# Patient Record
Sex: FEMALE | Race: WHITE | NOT HISPANIC OR LATINO | Employment: FULL TIME | URBAN - METROPOLITAN AREA
[De-identification: names, ages, dates, MRNs, and addresses within clinical notes are randomized per-mention and may not be internally consistent; named-entity substitution may affect disease eponyms.]

---

## 2022-02-28 ENCOUNTER — TELEPHONE (OUTPATIENT)
Dept: NEUROLOGY | Facility: CLINIC | Age: 27
End: 2022-02-28
Payer: COMMERCIAL

## 2022-02-28 NOTE — TELEPHONE ENCOUNTER
[facility] Call Center    Phone Message    May a detailed message be left on voicemail: yes     Reason for Call:  Patient is calling to schedule appt with  when available. Please call back    Action Taken: Message routed to:  [facility] [facility]    Travel Screening: Not Applicable

## 2022-05-20 NOTE — TELEPHONE ENCOUNTER
Action 5/20/22 MV 8.27am   Action Taken Imaging request faxed to PN +     5/23/22 MV 12.36p  Images resolved in PACS         RECORDS RECEIVED FROM: self - prev pt   REASON FOR VISIT: MS   Date of Appt: 7/6/22   NOTES (FOR ALL VISITS) STATUS DETAILS   OFFICE NOTE from other specialist Care Everywhere Dr Ana Kwok @  Neuro:  10/1/21  10/7/20  2/28/20  (additional)   DISCHARGE SUMMARY from hospital Care Everywhere [facility]:  9/14/18-9/17/18   MEDICATION LIST Care Everywhere    IMAGING  (FOR ALL VISITS)     LUMBAR PUNCTURE Care Everywhere [facility]:  10/8/18  9/15/18   MRI (HEAD, NECK, SPINE) Received [facility]:  MRI Brain 8/6/21  MRI Brain 8/4/20  MRI Thoracic Spine 8/4/20  MRI Cervical Spine 8/4/20  MRI Brain 12/21/18  MRI Thoracic Spine 10/5/18  MRI Cervical Spine 10/5/18  MRI Brain 9/30/18  MRI Brain 9/14/18    [facility]:  MRI Brain 11/4/19

## 2022-07-06 ENCOUNTER — PRE VISIT (OUTPATIENT)
Dept: NEUROLOGY | Facility: CLINIC | Age: 27
End: 2022-07-06
Payer: COMMERCIAL

## 2022-07-06 ENCOUNTER — TELEPHONE (OUTPATIENT)
Dept: NEUROLOGY | Facility: CLINIC | Age: 27
End: 2022-07-06

## 2022-07-06 ENCOUNTER — OFFICE VISIT (OUTPATIENT)
Dept: NEUROLOGY | Facility: CLINIC | Age: 27
End: 2022-07-06
Attending: PSYCHIATRY & NEUROLOGY
Payer: COMMERCIAL

## 2022-07-06 VITALS
HEIGHT: 60 IN | HEART RATE: 83 BPM | WEIGHT: 141 LBS | SYSTOLIC BLOOD PRESSURE: 114 MMHG | BODY MASS INDEX: 27.68 KG/M2 | DIASTOLIC BLOOD PRESSURE: 73 MMHG

## 2022-07-06 DIAGNOSIS — Z51.81 THERAPEUTIC DRUG MONITORING: ICD-10-CM

## 2022-07-06 DIAGNOSIS — G35 MS (MULTIPLE SCLEROSIS) (H): Primary | ICD-10-CM

## 2022-07-06 PROCEDURE — G0463 HOSPITAL OUTPT CLINIC VISIT: HCPCS

## 2022-07-06 PROCEDURE — 99214 OFFICE O/P EST MOD 30 MIN: CPT | Performed by: PSYCHIATRY & NEUROLOGY

## 2022-07-06 RX ORDER — HEPARIN SODIUM (PORCINE) LOCK FLUSH IV SOLN 100 UNIT/ML 100 UNIT/ML
5 SOLUTION INTRAVENOUS
Status: CANCELLED | OUTPATIENT
Start: 2022-12-19

## 2022-07-06 RX ORDER — EPINEPHRINE 1 MG/ML
0.3 INJECTION, SOLUTION, CONCENTRATE INTRAVENOUS EVERY 5 MIN PRN
Status: CANCELLED | OUTPATIENT
Start: 2022-12-19

## 2022-07-06 RX ORDER — METHYLPREDNISOLONE SODIUM SUCCINATE 125 MG/2ML
125 INJECTION, POWDER, LYOPHILIZED, FOR SOLUTION INTRAMUSCULAR; INTRAVENOUS
Status: CANCELLED
Start: 2022-12-19

## 2022-07-06 RX ORDER — ACETAMINOPHEN 325 MG/1
650 TABLET ORAL ONCE
Status: CANCELLED
Start: 2022-12-19

## 2022-07-06 RX ORDER — DIPHENHYDRAMINE HCL 25 MG
50 CAPSULE ORAL ONCE
Status: CANCELLED
Start: 2022-12-19

## 2022-07-06 RX ORDER — ALBUTEROL SULFATE 90 UG/1
1-2 AEROSOL, METERED RESPIRATORY (INHALATION)
Status: CANCELLED
Start: 2022-12-19

## 2022-07-06 RX ORDER — MEPERIDINE HYDROCHLORIDE 25 MG/ML
25 INJECTION INTRAMUSCULAR; INTRAVENOUS; SUBCUTANEOUS EVERY 30 MIN PRN
Status: CANCELLED | OUTPATIENT
Start: 2022-12-19

## 2022-07-06 RX ORDER — METHYLPREDNISOLONE SODIUM SUCCINATE 125 MG/2ML
125 INJECTION, POWDER, LYOPHILIZED, FOR SOLUTION INTRAMUSCULAR; INTRAVENOUS ONCE
Status: CANCELLED | OUTPATIENT
Start: 2022-12-19

## 2022-07-06 RX ORDER — HEPARIN SODIUM,PORCINE 10 UNIT/ML
5 VIAL (ML) INTRAVENOUS
Status: CANCELLED | OUTPATIENT
Start: 2022-12-19

## 2022-07-06 RX ORDER — ALBUTEROL SULFATE 0.83 MG/ML
2.5 SOLUTION RESPIRATORY (INHALATION)
Status: CANCELLED | OUTPATIENT
Start: 2022-12-19

## 2022-07-06 RX ORDER — METHYLPHENIDATE HYDROCHLORIDE 27 MG/1
TABLET, EXTENDED RELEASE ORAL
COMMUNITY
Start: 2022-06-16 | End: 2022-10-10

## 2022-07-06 RX ORDER — DIPHENHYDRAMINE HYDROCHLORIDE 50 MG/ML
50 INJECTION INTRAMUSCULAR; INTRAVENOUS
Status: CANCELLED
Start: 2022-12-19

## 2022-07-06 ASSESSMENT — PAIN SCALES - GENERAL: PAINLEVEL: NO PAIN (0)

## 2022-07-06 NOTE — LETTER
7/6/2022       RE: Robbi Bearden  [address] [address]     Dear Colleague,    Thank you for referring your patient, Robbi Bearden, to the [facility] MULTIPLE SCLEROSIS CLINIC [place] at [facility]. Please see a copy of my visit note below.    Date of Service: 7/6/2022    [facility] Neurology   MS Clinic Follow-up     Subjective: 27-year-old woman with a history of PCOS who presents in follow-up for multiple sclerosis.    Is remained active by doing Pilates 4-5 times per week.    She has not experienced any new symptoms related to multiple sclerosis.  She reports having a single viral like illness, potentially concerning for COVID.  However, denies frequent illnesses otherwise.    She has been struggling with allergies.    She has questions about pregnancy planning.    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    DMD's:   Rituximab 10/25/18-present, last dose 6/8/2021 of 1000 mg q6mo   rtx 500 mg 12/29/21 - present, LD 6/30/22    Vitamin D: 50,000 international units every week      No Known Allergies    Current Outpatient Medications   Medication     CONCERTA 27 MG CR tablet     IBUPROFEN PO     No current facility-administered medications for this visit.        Past medical, surgical, social and family history was personally reviewed. Pertinent details noted above.     Physical Examination:   /73 (BP Location: Left arm, Patient Position: Chair, Cuff Size: Adult Regular)   Pulse 83   Ht 1.524 m (5')   Wt 64 kg (141 lb)   BMI 27.54 kg/m      General: no acute distress  Cranial nerves:   VFFC  PERRL w/no RAPD  EOM full w/no FLOR   Face symmetric  Hearing intact  No dysarthria   Motor:   Tone is normal   Bulk is normal     R L  Deltoid  5 5  Biceps  5 5  Triceps 5 5  Wrist ext 5 5  Finger ext 5 5  Finger abd 5 5    Hip flexion 5 5  Knee flexion 5 5  Knee ext 5 5  Ankle d/f 5 5    Reflexes: 2+  and symmetric throughout, babinski absent bilaterally  Sensory: vibration is normal in the toes, JPS normal in the toes   Romberg is absent  Coordination: no ataxia or dysmetria  Gait: normal base and stride, tandem gait is intact, able to balance on one foot and hop x 5 bilaterally    Tests/Imaging:   JCV 3.2  D 55 1/2019   30 10/2020    MRI brain   9/14/18 - there are a number of periventricular demyelinating lesions noted predominantly off the atria and occipital poles bilaterally but more prominent on the right, all of the lesions enhance, there is typical ring-enhancement noted that is more apparent on the coronal views whereas enhancement pattern on axial views appears more heterogeneous  9/30/18 - personally compared to the initial study with pt, slight growth in lesions on the right side  12/21/18 - reduction in size of lesions, resolution of gadolinium enhancement, one new lesion off right atrium punctate in size but gd-  11/2019 - no new lesions, gd-  8/2020 - no new lesions, gd-  8/2021 - no new lesions, gd-     MRI cervical spine  10/2018 - personally reviewed, no definite lesions, gd-, poor quality  8/2020 - no new lesions, gd-    MRI thoracic spine  10/2018 - personally reviewed, no definite lesions though possible small eccentric lesions present, gd-, small syrinx  8/2020 - no new lesions, gd-, stable syrinx       Assessment: 27-year-old woman with relapsing remitting multiple sclerosis who appears to be clinically stable on rituximab.  She is due for radiologic surveillance, which I am recommending based on the reduced dose of rituximab.    We discussed risks associated with rituximab treatment and COVID-19.  She was encouraged to receive that evusheld clonal antibody.    We reviewed expectations regarding  pregnancy planning.    Plan:   -MRI brain and cervical spine  - Continue rituximab 500 mg every 6 months  - Blood work to be done on the day of infusion  - Follow-up in 6 months    Note was  completed with the assistance of Dragon Fluency software which can often result in accidental word substitutions.     A total of 30 minutes on the date of service were spent in the care of this patient.     Ana Kwok MD on 7/6/2022 at 5:08 PM

## 2022-07-06 NOTE — TELEPHONE ENCOUNTER
Orders for rituxan placed    Next due dec    Chanas Juliette Carmel    Thanks, Ana Kwok MD on 7/6/2022 at 5:40 PM

## 2022-07-06 NOTE — PROGRESS NOTES
Date of Service: 7/6/2022    [facility] Neurology   MS Clinic Follow-up     Subjective: 27-year-old woman with a history of PCOS who presents in follow-up for multiple sclerosis.    Is remained active by doing Pilates 4-5 times per week.    She has not experienced any new symptoms related to multiple sclerosis.  She reports having a single viral like illness, potentially concerning for COVID.  However, denies frequent illnesses otherwise.    She has been struggling with allergies.    She has questions about pregnancy planning.    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    DMD's:   Rituximab 10/25/18-present, last dose 6/8/2021 of 1000 mg q6mo   rtx 500 mg 12/29/21 - present, LD 6/30/22    Vitamin D: 50,000 international units every week      No Known Allergies    Current Outpatient Medications   Medication     CONCERTA 27 MG CR tablet     IBUPROFEN PO     No current facility-administered medications for this visit.        Past medical, surgical, social and family history was personally reviewed. Pertinent details noted above.     Physical Examination:   /73 (BP Location: Left arm, Patient Position: Chair, Cuff Size: Adult Regular)   Pulse 83   Ht 1.524 m (5')   Wt 64 kg (141 lb)   BMI 27.54 kg/m      General: no acute distress  Cranial nerves:   VFFC  PERRL w/no RAPD  EOM full w/no FLOR   Face symmetric  Hearing intact  No dysarthria   Motor:   Tone is normal   Bulk is normal     R L  Deltoid  5 5  Biceps  5 5  Triceps 5 5  Wrist ext 5 5  Finger ext 5 5  Finger abd 5 5    Hip flexion 5 5  Knee flexion 5 5  Knee ext 5 5  Ankle d/f 5 5    Reflexes: 2+ and symmetric throughout, babinski absent bilaterally  Sensory: vibration is normal in the toes, JPS normal in the toes   Romberg is absent  Coordination: no ataxia or dysmetria  Gait: normal base and stride, tandem gait is intact, able to balance on one foot and hop x 5 bilaterally    Tests/Imaging:   JCV 3.2  D 55  1/2019   30 10/2020    MRI brain   9/14/18 - there are a number of periventricular demyelinating lesions noted predominantly off the atria and occipital poles bilaterally but more prominent on the right, all of the lesions enhance, there is typical ring-enhancement noted that is more apparent on the coronal views whereas enhancement pattern on axial views appears more heterogeneous  9/30/18 - personally compared to the initial study with pt, slight growth in lesions on the right side  12/21/18 - reduction in size of lesions, resolution of gadolinium enhancement, one new lesion off right atrium punctate in size but gd-  11/2019 - no new lesions, gd-  8/2020 - no new lesions, gd-  8/2021 - no new lesions, gd-     MRI cervical spine  10/2018 - personally reviewed, no definite lesions, gd-, poor quality  8/2020 - no new lesions, gd-    MRI thoracic spine  10/2018 - personally reviewed, no definite lesions though possible small eccentric lesions present, gd-, small syrinx  8/2020 - no new lesions, gd-, stable syrinx       Assessment: 27-year-old woman with relapsing remitting multiple sclerosis who appears to be clinically stable on rituximab.  She is due for radiologic surveillance, which I am recommending based on the reduced dose of rituximab.    We discussed risks associated with rituximab treatment and COVID-19.  She was encouraged to receive that evusheld clonal antibody.    We reviewed expectations regarding  pregnancy planning.    Plan:   -MRI brain and cervical spine  - Continue rituximab 500 mg every 6 months  - Blood work to be done on the day of infusion  - Follow-up in 6 months    Note was completed with the assistance of Dragon Fluency software which can often result in accidental word substitutions.     A total of 30 minutes on the date of service were spent in the care of this patient.   Ana Kwok MD on 7/6/2022 at 5:08 PM

## 2022-07-06 NOTE — PATIENT INSTRUCTIONS
Your exam looks great    Continue 1/2 dose rituximab every six months   Blood work on day of infusion     Mri now     Follow up in 6 months     You are a candidate for Meir.  This is a monoclonal antibody that helps to boost your immunity to COVID19.  It is administered as 2 intramuscular injections every 6 months.  You can call [phone] to arrange an appointment.

## 2022-07-09 ENCOUNTER — HEALTH MAINTENANCE LETTER (OUTPATIENT)
Age: 27
End: 2022-07-09

## 2022-08-24 ENCOUNTER — ANCILLARY PROCEDURE (OUTPATIENT)
Dept: MRI IMAGING | Facility: CLINIC | Age: 27
End: 2022-08-24
Attending: PSYCHIATRY & NEUROLOGY
Payer: COMMERCIAL

## 2022-08-24 DIAGNOSIS — G35 MS (MULTIPLE SCLEROSIS) (H): ICD-10-CM

## 2022-08-24 PROCEDURE — A9585 GADOBUTROL INJECTION: HCPCS | Performed by: STUDENT IN AN ORGANIZED HEALTH CARE EDUCATION/TRAINING PROGRAM

## 2022-08-24 PROCEDURE — 70553 MRI BRAIN STEM W/O & W/DYE: CPT | Mod: GC | Performed by: STUDENT IN AN ORGANIZED HEALTH CARE EDUCATION/TRAINING PROGRAM

## 2022-08-24 RX ORDER — GADOBUTROL 604.72 MG/ML
7.5 INJECTION INTRAVENOUS ONCE
Status: COMPLETED | OUTPATIENT
Start: 2022-08-24 | End: 2022-08-24

## 2022-08-24 RX ADMIN — GADOBUTROL 6.5 ML: 604.72 INJECTION INTRAVENOUS at 18:23

## 2022-09-03 ENCOUNTER — HEALTH MAINTENANCE LETTER (OUTPATIENT)
Age: 27
End: 2022-09-03

## 2022-10-03 NOTE — TELEPHONE ENCOUNTER
VMM left, asked pt to call back about plans for infusion, or to check mychart message. Mychart message sent.     Last infusion was 6/30/2022.    Mirella Murrieta RN

## 2022-10-10 ENCOUNTER — VIRTUAL VISIT (OUTPATIENT)
Dept: FAMILY MEDICINE | Facility: CLINIC | Age: 27
End: 2022-10-10
Payer: COMMERCIAL

## 2022-10-10 DIAGNOSIS — U07.1 INFECTION DUE TO 2019 NOVEL CORONAVIRUS: Primary | ICD-10-CM

## 2022-10-10 PROCEDURE — 99203 OFFICE O/P NEW LOW 30 MIN: CPT | Mod: CS | Performed by: NURSE PRACTITIONER

## 2022-10-10 RX ORDER — DEXTROAMPHETAMINE SACCHARATE, AMPHETAMINE ASPARTATE, DEXTROAMPHETAMINE SULFATE AND AMPHETAMINE SULFATE 2.5; 2.5; 2.5; 2.5 MG/1; MG/1; MG/1; MG/1
10 TABLET ORAL
COMMUNITY
Start: 2022-09-28

## 2022-10-10 NOTE — PROGRESS NOTES
Robbi is a 27 year old who is being evaluated via a billable video visit.      How would you like to obtain your AVS? MyChart  If the video visit is dropped, the invitation should be resent by: Text to cell phone: [phone]  Will anyone else be joining your video visit? No        Assessment & Plan   Problem List Items Addressed This Visit    None  Visit Diagnoses     Infection due to 2019 novel coronavirus    -  Primary    Relevant Medications    nirmatrelvir and ritonavir (PAXLOVID) therapy pack           COVID-19 positive patient.  Encounter for consideration of medication intervention. Patient does qualify for a prescription. Full discussion with patient including medication options, risks and benefits. Potential drug interactions reviewed with patient.     Treatment Planned paxlovid sent to her pharmacy    Temporary change to home medications: hold adderrall if not needed     GIDEON Hess CNP  [facility] EMILY Culp is a 27 year old, presenting for the following health issues:  No chief complaint on file.      HPI           COVID-19 Symptom Review  How many days ago did these symptoms start? Thurs 6th , tested positive  Yesterday  9th Are any of the following symptoms significant for you?    New or worsening difficulty breathing? No    Worsening cough? Yes, it's a dry cough.     Fever or chills? Yes, I felt feverish or had chills.    Headache: YES    Sore throat: YES    Chest pain: No    Diarrhea: No    Body aches? YES    What treatments has patient tried? Acetaminophen   Does patient live in a nursing home, group home, or shelter? No  Does patient have a way to get food/medications during quarantined? Yes, I have a friend or family member who can help me.    Began symptomatic for covid Thursday 10/6, tested positive 10/9. Symptoms started Sore throat, painful to swallow, sweats, fever, ear feels off- like still on a plane. Recent trip to [place]. Has MS. Most  recent infusion June, next one in December   Still on Adderall, no other meds. No CP or SOB.       Review of Systems   Detailed as above         Objective           Vitals:  No vitals were obtained today due to virtual visit.    Physical Exam   GENERAL: Healthy, alert and no distress  EYES: Eyes grossly normal to inspection.  No discharge or erythema, or obvious scleral/conjunctival abnormalities.  RESP: No audible wheeze, cough, or visible cyanosis.  No visible retractions or increased work of breathing.    SKIN: Visible skin clear. No significant rash, abnormal pigmentation or lesions.  NEURO: Cranial nerves grossly intact.  Mentation and speech appropriate for age.  PSYCH: Mentation appears normal, affect normal/bright, judgement and insight intact, normal speech and appearance well-groomed.            Video-Visit Details    Video Start Time: 10:20 AM    Type of service:  Video Visit    Video End Time:10:31 AM    Originating Location (pt. Location): Home    Distant Location (provider location):  [facility]     Platform used for Video Visit: ValeryClarisonic

## 2022-12-13 ENCOUNTER — TELEPHONE (OUTPATIENT)
Dept: NEUROLOGY | Facility: CLINIC | Age: 27
End: 2022-12-13

## 2022-12-13 NOTE — TELEPHONE ENCOUNTER
Prior Authorization Infusion/Clinic Administered Request    Location: [place]  Diagnosis and ICD:Multiple sclerosis, G35  Drug/Therapy: Rituximab 500 mg every 6 months    Previously Tried and Failed Therapies: N/A. Currently stable on current rituximab regiment. This will be first dose within [place].    Date of provider note with supporting information: 7/6/2022    Urgency (When is the patient scheduled?): January 10, 2023    Would you like to include any research articles?         If yes please call [phone] for further instructions about sending that information

## 2023-01-10 ENCOUNTER — INFUSION THERAPY VISIT (OUTPATIENT)
Dept: INFUSION THERAPY | Facility: CLINIC | Age: 28
End: 2023-01-10
Payer: COMMERCIAL

## 2023-01-10 ENCOUNTER — LAB (OUTPATIENT)
Dept: LAB | Facility: CLINIC | Age: 28
End: 2023-01-10
Payer: COMMERCIAL

## 2023-01-10 VITALS
RESPIRATION RATE: 16 BRPM | TEMPERATURE: 98 F | OXYGEN SATURATION: 100 % | BODY MASS INDEX: 28.81 KG/M2 | SYSTOLIC BLOOD PRESSURE: 100 MMHG | HEART RATE: 86 BPM | WEIGHT: 147.5 LBS | DIASTOLIC BLOOD PRESSURE: 66 MMHG

## 2023-01-10 DIAGNOSIS — G35 MS (MULTIPLE SCLEROSIS) (H): ICD-10-CM

## 2023-01-10 DIAGNOSIS — G35 MS (MULTIPLE SCLEROSIS) (H): Primary | ICD-10-CM

## 2023-01-10 DIAGNOSIS — Z51.81 THERAPEUTIC DRUG MONITORING: ICD-10-CM

## 2023-01-10 LAB
BASOPHILS # BLD AUTO: 0 10E3/UL (ref 0–0.2)
BASOPHILS NFR BLD AUTO: 1 %
CD19 CELLS # BLD: 1 CELLS/UL (ref 107–698)
CD19 CELLS NFR BLD: <1 % (ref 6–27)
EOSINOPHIL # BLD AUTO: 0.1 10E3/UL (ref 0–0.7)
EOSINOPHIL NFR BLD AUTO: 2 %
ERYTHROCYTE [DISTWIDTH] IN BLOOD BY AUTOMATED COUNT: 12.5 % (ref 10–15)
HCT VFR BLD AUTO: 42.4 % (ref 35–47)
HGB BLD-MCNC: 14.6 G/DL (ref 11.7–15.7)
IMM GRANULOCYTES # BLD: 0 10E3/UL
IMM GRANULOCYTES NFR BLD: 1 %
LYMPHOCYTES # BLD AUTO: 1.9 10E3/UL (ref 0.8–5.3)
LYMPHOCYTES NFR BLD AUTO: 25 %
MCH RBC QN AUTO: 27.9 PG (ref 26.5–33)
MCHC RBC AUTO-ENTMCNC: 34.4 G/DL (ref 31.5–36.5)
MCV RBC AUTO: 81 FL (ref 78–100)
MONOCYTES # BLD AUTO: 0.7 10E3/UL (ref 0–1.3)
MONOCYTES NFR BLD AUTO: 9 %
NEUTROPHILS # BLD AUTO: 4.9 10E3/UL (ref 1.6–8.3)
NEUTROPHILS NFR BLD AUTO: 62 %
NRBC # BLD AUTO: 0 10E3/UL
NRBC BLD AUTO-RTO: 0 /100
PLATELET # BLD AUTO: 325 10E3/UL (ref 150–450)
RBC # BLD AUTO: 5.23 10E6/UL (ref 3.8–5.2)
WBC # BLD AUTO: 7.7 10E3/UL (ref 4–11)

## 2023-01-10 PROCEDURE — 86355 B CELLS TOTAL COUNT: CPT

## 2023-01-10 PROCEDURE — 96415 CHEMO IV INFUSION ADDL HR: CPT | Performed by: NURSE PRACTITIONER

## 2023-01-10 PROCEDURE — 99207 PR NO CHARGE LOS: CPT

## 2023-01-10 PROCEDURE — 82784 ASSAY IGA/IGD/IGG/IGM EACH: CPT

## 2023-01-10 PROCEDURE — 96413 CHEMO IV INFUSION 1 HR: CPT | Performed by: NURSE PRACTITIONER

## 2023-01-10 PROCEDURE — 85025 COMPLETE CBC W/AUTO DIFF WBC: CPT

## 2023-01-10 PROCEDURE — 36415 COLL VENOUS BLD VENIPUNCTURE: CPT

## 2023-01-10 PROCEDURE — 96375 TX/PRO/DX INJ NEW DRUG ADDON: CPT | Performed by: NURSE PRACTITIONER

## 2023-01-10 RX ORDER — EPINEPHRINE 1 MG/ML
0.3 INJECTION, SOLUTION INTRAMUSCULAR; SUBCUTANEOUS EVERY 5 MIN PRN
Status: CANCELLED | OUTPATIENT
Start: 2023-07-09

## 2023-01-10 RX ORDER — METHYLPREDNISOLONE SODIUM SUCCINATE 125 MG/2ML
125 INJECTION, POWDER, LYOPHILIZED, FOR SOLUTION INTRAMUSCULAR; INTRAVENOUS
Status: CANCELLED
Start: 2023-07-09

## 2023-01-10 RX ORDER — ALBUTEROL SULFATE 90 UG/1
1-2 AEROSOL, METERED RESPIRATORY (INHALATION)
Status: CANCELLED
Start: 2023-07-09

## 2023-01-10 RX ORDER — MEPERIDINE HYDROCHLORIDE 25 MG/ML
25 INJECTION INTRAMUSCULAR; INTRAVENOUS; SUBCUTANEOUS EVERY 30 MIN PRN
Status: CANCELLED | OUTPATIENT
Start: 2023-07-09

## 2023-01-10 RX ORDER — HEPARIN SODIUM,PORCINE 10 UNIT/ML
5 VIAL (ML) INTRAVENOUS
Status: CANCELLED | OUTPATIENT
Start: 2023-07-09

## 2023-01-10 RX ORDER — ACETAMINOPHEN 325 MG/1
650 TABLET ORAL ONCE
Status: CANCELLED
Start: 2023-07-09

## 2023-01-10 RX ORDER — ACETAMINOPHEN 325 MG/1
650 TABLET ORAL ONCE
Status: COMPLETED | OUTPATIENT
Start: 2023-01-10 | End: 2023-01-10

## 2023-01-10 RX ORDER — METFORMIN HCL 500 MG
500 TABLET, EXTENDED RELEASE 24 HR ORAL DAILY
COMMUNITY
Start: 2023-01-06 | End: 2023-06-28

## 2023-01-10 RX ORDER — METHYLPREDNISOLONE SODIUM SUCCINATE 125 MG/2ML
125 INJECTION, POWDER, LYOPHILIZED, FOR SOLUTION INTRAMUSCULAR; INTRAVENOUS ONCE
Status: COMPLETED | OUTPATIENT
Start: 2023-01-10 | End: 2023-01-10

## 2023-01-10 RX ORDER — DIPHENHYDRAMINE HCL 25 MG
50 CAPSULE ORAL ONCE
Status: COMPLETED | OUTPATIENT
Start: 2023-01-10 | End: 2023-01-10

## 2023-01-10 RX ORDER — HEPARIN SODIUM (PORCINE) LOCK FLUSH IV SOLN 100 UNIT/ML 100 UNIT/ML
5 SOLUTION INTRAVENOUS
Status: CANCELLED | OUTPATIENT
Start: 2023-07-09

## 2023-01-10 RX ORDER — METHYLPREDNISOLONE SODIUM SUCCINATE 125 MG/2ML
125 INJECTION, POWDER, LYOPHILIZED, FOR SOLUTION INTRAMUSCULAR; INTRAVENOUS ONCE
Status: CANCELLED | OUTPATIENT
Start: 2023-07-09

## 2023-01-10 RX ORDER — SPIRONOLACTONE 50 MG/1
1 TABLET, FILM COATED ORAL
COMMUNITY
Start: 2022-12-20 | End: 2023-12-14

## 2023-01-10 RX ORDER — DIPHENHYDRAMINE HYDROCHLORIDE 50 MG/ML
50 INJECTION INTRAMUSCULAR; INTRAVENOUS
Status: CANCELLED
Start: 2023-07-09

## 2023-01-10 RX ORDER — ALBUTEROL SULFATE 0.83 MG/ML
2.5 SOLUTION RESPIRATORY (INHALATION)
Status: CANCELLED | OUTPATIENT
Start: 2023-07-09

## 2023-01-10 RX ORDER — DIPHENHYDRAMINE HCL 25 MG
50 CAPSULE ORAL ONCE
Status: CANCELLED
Start: 2023-07-09

## 2023-01-10 RX ADMIN — ACETAMINOPHEN 650 MG: 325 TABLET ORAL at 09:09

## 2023-01-10 RX ADMIN — Medication 25 MG: at 09:09

## 2023-01-10 RX ADMIN — Medication 250 ML: at 09:14

## 2023-01-10 RX ADMIN — METHYLPREDNISOLONE SODIUM SUCCINATE 125 MG: 125 INJECTION INTRAMUSCULAR; INTRAVENOUS at 09:18

## 2023-01-10 NOTE — PROGRESS NOTES
Infusion Nursing Note:  Robbi Bearden presents today for Rapid Rituxan.    Patient seen by provider today: No   present during visit today: Not Applicable.    Note: Patient oriented to infusion. Reports she's been receiving Rituxan for a few years without any complications.     Intravenous Access:  Peripheral IV placed.    Treatment Conditions:  Biological Infusion Checklist:  ~~~ NOTE: If the patient answers yes to any of the questions below, hold the infusion and contact ordering provider or on-call provider.    1. Have you recently had an elevated temperature, fever, chills, productive cough, coughing for 3 weeks or longer or hemoptysis, abnormal vital signs, night sweats,  chest pain or have you noticed a decrease in your appetite, unexplained weight loss or fatigue? No  2. Do you have any open wounds or new incisions? No  3. Do you have any recent or upcoming hospitalizations, surgeries or dental procedures? No  4. Do you currently have or recently have had any signs of illness or infection or are you on any antibiotics? No  5. Have you had any new, sudden or worsening abdominal pain? No  6. Have you or anyone in your household received a live vaccination in the past 4 weeks? Please note:  No live vaccines while on biologic/chemotherapy until 6 months after the last treatment.  Patient can receive the flu vaccine (shot only) and the pneumovax.  It is optimal for the patient to get these vaccines mid cycle, but they can be given at any time as long as it is not on the day of the infusion. No  7. Have you recently been diagnosed with any new nervous system diseases (ie. Multiple sclerosis, Guillain [place], seizures, neurological changes) or cancer diagnosis? No  8. Are you on any form of radiation or chemotherapy? No  9. Are you pregnant or breast feeding or do you have plans of pregnancy in the future? No  10. Have you been having any signs of worsening depression or suicidal ideations?  (benlysta  only) No  11. Have there been any other new onset medical symptoms? No      Post Infusion Assessment:  Patient tolerated infusion without incident.  Site patent and intact, free from redness, edema or discomfort.  No evidence of extravasations.  Access discontinued per protocol.  Biologic Infusion Post Education: Call the triage nurse at your clinic or seek medical attention if you have chills and/or temperature greater than or equal to 100.5, uncontrolled nausea/vomiting, diarrhea, constipation, dizziness, shortness of breath, chest pain, heart palpitations, weakness or any other new or concerning symptoms, questions or concerns.  You cannot have any live virus vaccines prior to or during treatment or up to 6 months post infusion.  If you have an upcoming surgery, medical procedure or dental procedure during treatment, this should be discussed with your ordering physician and your surgeon/dentist.  If you are having any concerning symptom, if you are unsure if you should get your next infusion or wish to speak to a provider before your next infusion, please call your care coordinator or triage nurse at your clinic to notify them so we can adequately serve you.     Discharge Plan:   AVS to patient via [employer].  Patient will return in 6 months for next appointment.   Patient discharged in stable condition accompanied by: self.  Departure Mode: Ambulatory.      Kylah Bowers RN

## 2023-01-11 LAB — IGG SERPL-MCNC: 696 MG/DL (ref 610–1616)

## 2023-06-28 ENCOUNTER — LAB (OUTPATIENT)
Dept: LAB | Facility: CLINIC | Age: 28
End: 2023-06-28
Payer: COMMERCIAL

## 2023-06-28 ENCOUNTER — OFFICE VISIT (OUTPATIENT)
Dept: NEUROLOGY | Facility: CLINIC | Age: 28
End: 2023-06-28
Attending: PSYCHIATRY & NEUROLOGY
Payer: COMMERCIAL

## 2023-06-28 VITALS
HEART RATE: 86 BPM | WEIGHT: 159.2 LBS | DIASTOLIC BLOOD PRESSURE: 83 MMHG | BODY MASS INDEX: 31.09 KG/M2 | SYSTOLIC BLOOD PRESSURE: 118 MMHG | OXYGEN SATURATION: 98 %

## 2023-06-28 DIAGNOSIS — G35 MS (MULTIPLE SCLEROSIS) (H): Primary | ICD-10-CM

## 2023-06-28 DIAGNOSIS — Z51.81 THERAPEUTIC DRUG MONITORING: ICD-10-CM

## 2023-06-28 DIAGNOSIS — E55.9 VITAMIN D DEFICIENCY: ICD-10-CM

## 2023-06-28 DIAGNOSIS — G35 MS (MULTIPLE SCLEROSIS) (H): ICD-10-CM

## 2023-06-28 LAB
BASOPHILS # BLD AUTO: 0.1 10E3/UL (ref 0–0.2)
BASOPHILS NFR BLD AUTO: 1 %
EOSINOPHIL # BLD AUTO: 0.2 10E3/UL (ref 0–0.7)
EOSINOPHIL NFR BLD AUTO: 2 %
ERYTHROCYTE [DISTWIDTH] IN BLOOD BY AUTOMATED COUNT: 11.9 % (ref 10–15)
HCT VFR BLD AUTO: 44.7 % (ref 35–47)
HGB BLD-MCNC: 15.6 G/DL (ref 11.7–15.7)
IMM GRANULOCYTES # BLD: 0 10E3/UL
IMM GRANULOCYTES NFR BLD: 0 %
LYMPHOCYTES # BLD AUTO: 2.5 10E3/UL (ref 0.8–5.3)
LYMPHOCYTES NFR BLD AUTO: 31 %
MCH RBC QN AUTO: 28.3 PG (ref 26.5–33)
MCHC RBC AUTO-ENTMCNC: 34.9 G/DL (ref 31.5–36.5)
MCV RBC AUTO: 81 FL (ref 78–100)
MONOCYTES # BLD AUTO: 0.8 10E3/UL (ref 0–1.3)
MONOCYTES NFR BLD AUTO: 10 %
NEUTROPHILS # BLD AUTO: 4.4 10E3/UL (ref 1.6–8.3)
NEUTROPHILS NFR BLD AUTO: 56 %
NRBC # BLD AUTO: 0 10E3/UL
NRBC BLD AUTO-RTO: 0 /100
PLATELET # BLD AUTO: 330 10E3/UL (ref 150–450)
RBC # BLD AUTO: 5.52 10E6/UL (ref 3.8–5.2)
WBC # BLD AUTO: 7.9 10E3/UL (ref 4–11)

## 2023-06-28 PROCEDURE — G0463 HOSPITAL OUTPT CLINIC VISIT: HCPCS | Performed by: PSYCHIATRY & NEUROLOGY

## 2023-06-28 PROCEDURE — 85025 COMPLETE CBC W/AUTO DIFF WBC: CPT | Performed by: PATHOLOGY

## 2023-06-28 PROCEDURE — 99000 SPECIMEN HANDLING OFFICE-LAB: CPT | Performed by: PATHOLOGY

## 2023-06-28 PROCEDURE — 82784 ASSAY IGA/IGD/IGG/IGM EACH: CPT | Performed by: PSYCHIATRY & NEUROLOGY

## 2023-06-28 PROCEDURE — 82306 VITAMIN D 25 HYDROXY: CPT | Performed by: PSYCHIATRY & NEUROLOGY

## 2023-06-28 PROCEDURE — 36415 COLL VENOUS BLD VENIPUNCTURE: CPT | Performed by: PATHOLOGY

## 2023-06-28 PROCEDURE — 99215 OFFICE O/P EST HI 40 MIN: CPT | Performed by: PSYCHIATRY & NEUROLOGY

## 2023-06-28 PROCEDURE — 86355 B CELLS TOTAL COUNT: CPT | Performed by: PSYCHIATRY & NEUROLOGY

## 2023-06-28 ASSESSMENT — PAIN SCALES - GENERAL: PAINLEVEL: NO PAIN (0)

## 2023-06-28 NOTE — PATIENT INSTRUCTIONS
Blood work today     I will send a message to my nurses about the rituximab due now     Mri in august     Follow up in 6 months

## 2023-06-28 NOTE — NURSING NOTE
Chief Complaint   Patient presents with     MS     RECHECK     Annual follow up      Vitals were taken and medications were reconciled.   Subhash Perry, EMT  3:54 PM

## 2023-06-28 NOTE — LETTER
6/28/2023       RE: Robbi Bearden  [address]  [facility] [address]     Dear Colleague,    Thank you for referring your patient, Robbi Bearden, to the [facility] MULTIPLE SCLEROSIS CLINIC [place] [facility]. Please see a copy of my visit note below.    Date of Service: 6/28/2023    [facility] Neurology   MS Clinic Follow-up     Subjective: 28-year-old woman with a history of PCOS who presents in follow-up for multiple sclerosis.    No new symptoms     No infections     Tolerating rituximab iinfusions     Engaged and has wedding planned for sept  Planning to try pregnancy   Has questions about management     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    DMD's:   Rituximab 10/25/18-present, last dose 6/8/2021 of 1000 mg q6mo   rtx 500 mg 12/29/21 - present, LD 1/10/23    Vitamin D: 50,000 international units every week      No Known Allergies    Current Outpatient Medications   Medication    amphetamine-dextroamphetamine (ADDERALL) 10 MG tablet    IBUPROFEN PO    spironolactone (ALDACTONE) 50 MG tablet    metFORMIN (GLUCOPHAGE XR) 500 MG 24 hr tablet     No current facility-administered medications for this visit.        Past medical, surgical, social and family history was personally reviewed. Pertinent details noted above.     Physical Examination:   /83 (BP Location: Left arm, Patient Position: Sitting, Cuff Size: Adult Regular)   Pulse 86   Wt 72.2 kg (159 lb 3.2 oz)   SpO2 98%   BMI 31.09 kg/m      General: no acute distress  Cranial nerves:   VFFC  PERRL w/no RAPD  EOM full w/no FLOR   Face symmetric  Hearing intact  No dysarthria   Motor:   Tone is normal   Bulk is normal     R L  Deltoid  5 5  Biceps  5 5  Triceps 5 5  Wrist ext 5 5  Finger ext 5 5  Finger abd 5 5    Hip flexion 5 5  Knee flexion 5 5  Knee ext 5 5  Ankle d/f 5 5    Reflexes: 2+ and symmetric throughout, babinski absent  bilaterally  Sensory: vibration is normal in the toes, JPS normal in the toes   Romberg is absent  Coordination: no ataxia or dysmetria  Gait: normal base and stride, tandem gait is intact, able to balance on one foot and hop x 5 bilaterally    Tests/Imaging:   JCV 3.2  D 55 1/2019   30 10/2020    MRI brain   9/14/18 - there are a number of periventricular demyelinating lesions noted predominantly off the atria and occipital poles bilaterally but more prominent on the right, all of the lesions enhance, there is typical ring-enhancement noted that is more apparent on the coronal views whereas enhancement pattern on axial views appears more heterogeneous  9/30/18 - personally compared to the initial study with pt, slight growth in lesions on the right side  12/21/18 - reduction in size of lesions, resolution of gadolinium enhancement, one new lesion off right atrium punctate in size but gd-  11/2019 - no new lesions, gd-  8/2020 - no new lesions, gd-  8/2021 - no new lesions, gd-   8/2022 - no new lesions, gd-     MRI cervical spine  10/2018 - personally reviewed, no definite lesions, gd-, poor quality  8/2020 - no new lesions, gd-  8/2022 - no new lesions, deg changes noted    MRI thoracic spine  10/2018 - personally reviewed, no definite lesions though possible small eccentric lesions present, gd-, small syrinx  8/2020 - no new lesions, gd-, stable syrinx       Assessment: 28-year-old woman with relapsing remitting multiple sclerosis who remains clinically stable on rituximab     Advised updated imaging in august give plans to try to conceive    Discussed management of MS during pregnancy. rituximab preferred treatment. Can try to conceive as early as 8 weeks post infusion. Pregnancy test 1 week before an infusion.  Pregnancy protective.  Can receive rituximab while breast feeding if comfortable.     Plan:   -MRI brain, cervical and thoraic spine   - Continue rituximab 500 mg every 6 months  - Blood work today  -  Follow-up in 6 months    Note was completed with the assistance of Dragon Fluency software which can often result in accidental word substitutions.     A total of 40 minutes on the date of service were spent in the care of this patient.   Ana Kwok MD on 6/29/2023 at 8:18 AM                Again, thank you for allowing me to participate in the care of your patient.      Sincerely,    Ana Kwok MD

## 2023-06-28 NOTE — PROGRESS NOTES
Date of Service: 6/28/2023    [facility] Neurology   MS Clinic Follow-up     Subjective: 28-year-old woman with a history of PCOS who presents in follow-up for multiple sclerosis.    No new symptoms     No infections     Tolerating rituximab iinfusions     Engaged and has wedding planned for sept  Planning to try pregnancy   Has questions about management     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    DMD's:   Rituximab 10/25/18-present, last dose 6/8/2021 of 1000 mg q6mo   rtx 500 mg 12/29/21 - present, LD 1/10/23    Vitamin D: 50,000 international units every week      No Known Allergies    Current Outpatient Medications   Medication     amphetamine-dextroamphetamine (ADDERALL) 10 MG tablet     IBUPROFEN PO     spironolactone (ALDACTONE) 50 MG tablet     metFORMIN (GLUCOPHAGE XR) 500 MG 24 hr tablet     No current facility-administered medications for this visit.        Past medical, surgical, social and family history was personally reviewed. Pertinent details noted above.     Physical Examination:   /83 (BP Location: Left arm, Patient Position: Sitting, Cuff Size: Adult Regular)   Pulse 86   Wt 72.2 kg (159 lb 3.2 oz)   SpO2 98%   BMI 31.09 kg/m      General: no acute distress  Cranial nerves:   VFFC  PERRL w/no RAPD  EOM full w/no FLOR   Face symmetric  Hearing intact  No dysarthria   Motor:   Tone is normal   Bulk is normal     R L  Deltoid  5 5  Biceps  5 5  Triceps 5 5  Wrist ext 5 5  Finger ext 5 5  Finger abd 5 5    Hip flexion 5 5  Knee flexion 5 5  Knee ext 5 5  Ankle d/f 5 5    Reflexes: 2+ and symmetric throughout, babinski absent bilaterally  Sensory: vibration is normal in the toes, JPS normal in the toes   Romberg is absent  Coordination: no ataxia or dysmetria  Gait: normal base and stride, tandem gait is intact, able to balance on one foot and hop x 5 bilaterally    Tests/Imaging:   JCV 3.2  D 55 1/2019   30 10/2020    MRI brain   9/14/18 - there  are a number of periventricular demyelinating lesions noted predominantly off the atria and occipital poles bilaterally but more prominent on the right, all of the lesions enhance, there is typical ring-enhancement noted that is more apparent on the coronal views whereas enhancement pattern on axial views appears more heterogeneous  9/30/18 - personally compared to the initial study with pt, slight growth in lesions on the right side  12/21/18 - reduction in size of lesions, resolution of gadolinium enhancement, one new lesion off right atrium punctate in size but gd-  11/2019 - no new lesions, gd-  8/2020 - no new lesions, gd-  8/2021 - no new lesions, gd-   8/2022 - no new lesions, gd-     MRI cervical spine  10/2018 - personally reviewed, no definite lesions, gd-, poor quality  8/2020 - no new lesions, gd-  8/2022 - no new lesions, deg changes noted    MRI thoracic spine  10/2018 - personally reviewed, no definite lesions though possible small eccentric lesions present, gd-, small syrinx  8/2020 - no new lesions, gd-, stable syrinx       Assessment: 28-year-old woman with relapsing remitting multiple sclerosis who remains clinically stable on rituximab     Advised updated imaging in august give plans to try to conceive    Discussed management of MS during pregnancy. rituximab preferred treatment. Can try to conceive as early as 8 weeks post infusion. Pregnancy test 1 week before an infusion.  Pregnancy protective.  Can receive rituximab while breast feeding if comfortable.     Plan:   -MRI brain, cervical and thoraic spine   - Continue rituximab 500 mg every 6 months  - Blood work today  - Follow-up in 6 months    Note was completed with the assistance of Dragon Fluency software which can often result in accidental word substitutions.     A total of 40 minutes on the date of service were spent in the care of this patient.   Ana Kwok MD on 6/29/2023 at 8:18 AM

## 2023-06-29 LAB
CD19 B CELL COMMENT: ABNORMAL
CD19 CELLS # BLD: <1 CELLS/UL (ref 107–698)
CD19 CELLS NFR BLD: <1 % (ref 6–27)
DEPRECATED CALCIDIOL+CALCIFEROL SERPL-MC: 26 UG/L (ref 20–75)
IGG SERPL-MCNC: 701 MG/DL (ref 610–1616)

## 2023-07-22 ENCOUNTER — HEALTH MAINTENANCE LETTER (OUTPATIENT)
Age: 28
End: 2023-07-22

## 2023-07-27 ENCOUNTER — INFUSION THERAPY VISIT (OUTPATIENT)
Dept: INFUSION THERAPY | Facility: CLINIC | Age: 28
End: 2023-07-27
Payer: COMMERCIAL

## 2023-07-27 VITALS
HEIGHT: 60 IN | WEIGHT: 158.5 LBS | TEMPERATURE: 98.1 F | BODY MASS INDEX: 31.12 KG/M2 | RESPIRATION RATE: 16 BRPM | SYSTOLIC BLOOD PRESSURE: 110 MMHG | DIASTOLIC BLOOD PRESSURE: 79 MMHG | HEART RATE: 90 BPM | OXYGEN SATURATION: 98 %

## 2023-07-27 DIAGNOSIS — G35 MS (MULTIPLE SCLEROSIS) (H): Primary | ICD-10-CM

## 2023-07-27 PROCEDURE — 96415 CHEMO IV INFUSION ADDL HR: CPT | Performed by: NURSE PRACTITIONER

## 2023-07-27 PROCEDURE — 96375 TX/PRO/DX INJ NEW DRUG ADDON: CPT | Performed by: NURSE PRACTITIONER

## 2023-07-27 PROCEDURE — 96413 CHEMO IV INFUSION 1 HR: CPT | Performed by: NURSE PRACTITIONER

## 2023-07-27 RX ORDER — METHYLPREDNISOLONE SODIUM SUCCINATE 125 MG/2ML
125 INJECTION, POWDER, LYOPHILIZED, FOR SOLUTION INTRAMUSCULAR; INTRAVENOUS ONCE
Status: COMPLETED | OUTPATIENT
Start: 2023-07-27 | End: 2023-07-27

## 2023-07-27 RX ORDER — ALBUTEROL SULFATE 0.83 MG/ML
2.5 SOLUTION RESPIRATORY (INHALATION)
Status: CANCELLED | OUTPATIENT
Start: 2023-07-27

## 2023-07-27 RX ORDER — EPINEPHRINE 1 MG/ML
0.3 INJECTION, SOLUTION INTRAMUSCULAR; SUBCUTANEOUS EVERY 5 MIN PRN
Status: CANCELLED | OUTPATIENT
Start: 2023-07-27

## 2023-07-27 RX ORDER — METHYLPREDNISOLONE SODIUM SUCCINATE 125 MG/2ML
125 INJECTION, POWDER, LYOPHILIZED, FOR SOLUTION INTRAMUSCULAR; INTRAVENOUS ONCE
Status: CANCELLED | OUTPATIENT
Start: 2023-07-27

## 2023-07-27 RX ORDER — ACETAMINOPHEN 325 MG/1
650 TABLET ORAL ONCE
Status: CANCELLED
Start: 2023-07-27

## 2023-07-27 RX ORDER — DIPHENHYDRAMINE HCL 25 MG
50 CAPSULE ORAL ONCE
Status: COMPLETED | OUTPATIENT
Start: 2023-07-27 | End: 2023-07-27

## 2023-07-27 RX ORDER — HEPARIN SODIUM,PORCINE 10 UNIT/ML
5-20 VIAL (ML) INTRAVENOUS DAILY PRN
Status: CANCELLED | OUTPATIENT
Start: 2023-07-27

## 2023-07-27 RX ORDER — HEPARIN SODIUM (PORCINE) LOCK FLUSH IV SOLN 100 UNIT/ML 100 UNIT/ML
5 SOLUTION INTRAVENOUS
Status: CANCELLED | OUTPATIENT
Start: 2023-07-27

## 2023-07-27 RX ORDER — DIPHENHYDRAMINE HCL 25 MG
50 CAPSULE ORAL ONCE
Status: CANCELLED
Start: 2023-07-27

## 2023-07-27 RX ORDER — METHYLPREDNISOLONE SODIUM SUCCINATE 125 MG/2ML
125 INJECTION, POWDER, LYOPHILIZED, FOR SOLUTION INTRAMUSCULAR; INTRAVENOUS
Status: CANCELLED
Start: 2023-07-27

## 2023-07-27 RX ORDER — DIPHENHYDRAMINE HYDROCHLORIDE 50 MG/ML
50 INJECTION INTRAMUSCULAR; INTRAVENOUS
Status: CANCELLED
Start: 2023-07-27

## 2023-07-27 RX ORDER — MEPERIDINE HYDROCHLORIDE 25 MG/ML
25 INJECTION INTRAMUSCULAR; INTRAVENOUS; SUBCUTANEOUS EVERY 30 MIN PRN
Status: CANCELLED | OUTPATIENT
Start: 2023-07-27

## 2023-07-27 RX ORDER — ACETAMINOPHEN 325 MG/1
650 TABLET ORAL ONCE
Status: COMPLETED | OUTPATIENT
Start: 2023-07-27 | End: 2023-07-27

## 2023-07-27 RX ORDER — ALBUTEROL SULFATE 90 UG/1
1-2 AEROSOL, METERED RESPIRATORY (INHALATION)
Status: CANCELLED
Start: 2023-07-27

## 2023-07-27 RX ADMIN — ACETAMINOPHEN 650 MG: 325 TABLET ORAL at 13:21

## 2023-07-27 RX ADMIN — Medication 250 ML: at 13:23

## 2023-07-27 RX ADMIN — Medication 50 MG: at 13:21

## 2023-07-27 RX ADMIN — METHYLPREDNISOLONE SODIUM SUCCINATE 125 MG: 125 INJECTION INTRAMUSCULAR; INTRAVENOUS at 13:22

## 2023-07-27 NOTE — PROGRESS NOTES
Infusion Nursing Note:  Robbi Bearden presents today for Rapid Remicade.    Patient seen by provider today: No   present during visit today: Not Applicable.    Note: Premedications of oral tylenol, benadryl and solumedrol given. Patient reports no new medical concerns today.    Intravenous Access:  Peripheral IV placed.    Treatment Conditions:  Biological Infusion Checklist:  ~~~ NOTE: If the patient answers yes to any of the questions below, hold the infusion and contact ordering provider or on-call provider.    Have you recently had an elevated temperature, fever, chills, productive cough, coughing for 3 weeks or longer or hemoptysis,  abnormal vital signs, night sweats,  chest pain or have you noticed a decrease in your appetite, unexplained weight loss or fatigue? No  Do you have any open wounds or new incisions? No  Do you have any upcoming hospitalizations or surgeries? Does not include esophagogastroduodenoscopy, colonoscopy, endoscopic retrograde cholangiopancreatography (ERCP), endoscopic ultrasound (EUS), dental procedures or joint aspiration/steroid injections No  Do you currently have any signs of illness or infection or are you on any antibiotics? No  Have you had any new, sudden or worsening abdominal pain? No  Have you or anyone in your household received a live vaccination in the past 4 weeks? Please note: No live vaccines while on biologic/chemotherapy until 6 months after the last treatment. Patient can receive the flu vaccine (shot only), pneumovax and the Covid vaccine. It is optimal for the patient to get these vaccines mid cycle, but they can be given at any time as long as it is not on the day of the infusion. No  Have you recently been diagnosed with any new nervous system diseases (ie. Multiple sclerosis, Guillain Loda, seizures, neurological changes) or cancer diagnosis? Are you on any form of radiation or chemotherapy? No  Are you pregnant or breast feeding or do you have  plans of pregnancy in the future? No  Have there been any other new onset medical symptoms? No    Post Infusion Assessment:  Patient tolerated infusion without incident.  Site patent and intact, free from redness, edema or discomfort.  No evidence of extravasations.  Access discontinued per protocol.  Biologic Infusion Post Education: Call the triage nurse at your clinic or seek medical attention if you have chills and/or temperature greater than or equal to 100.5, uncontrolled nausea/vomiting, diarrhea, constipation, dizziness, shortness of breath, chest pain, heart palpitations, weakness or any other new or concerning symptoms, questions or concerns.  You cannot have any live virus vaccines prior to or during treatment or up to 6 months post infusion.  If you have an upcoming surgery, medical procedure or dental procedure during treatment, this should be discussed with your ordering physician and your surgeon/dentist.  If you are having any concerning symptom, if you are unsure if you should get your next infusion or wish to speak to a provider before your next infusion, please call your care coordinator or triage nurse at your clinic to notify them so we can adequately serve you.       Discharge Plan:   Discharge instructions reviewed with: Patient.  Patient and/or family verbalized understanding of discharge instructions and all questions answered.  Patient discharged in stable condition accompanied by: self.  Departure Mode: Ambulatory.      Nancy Chow RN

## 2023-10-04 ENCOUNTER — ANCILLARY PROCEDURE (OUTPATIENT)
Dept: MRI IMAGING | Facility: CLINIC | Age: 28
End: 2023-10-04
Attending: PSYCHIATRY & NEUROLOGY
Payer: COMMERCIAL

## 2023-10-04 DIAGNOSIS — G35 MS (MULTIPLE SCLEROSIS) (H): ICD-10-CM

## 2023-10-04 PROCEDURE — A9585 GADOBUTROL INJECTION: HCPCS | Mod: JZ | Performed by: RADIOLOGY

## 2023-10-04 PROCEDURE — 72156 MRI NECK SPINE W/O & W/DYE: CPT | Performed by: RADIOLOGY

## 2023-10-04 PROCEDURE — 70553 MRI BRAIN STEM W/O & W/DYE: CPT | Performed by: RADIOLOGY

## 2023-10-04 PROCEDURE — 72157 MRI CHEST SPINE W/O & W/DYE: CPT | Performed by: RADIOLOGY

## 2023-10-04 RX ORDER — GADOBUTROL 604.72 MG/ML
7.5 INJECTION INTRAVENOUS ONCE
Status: COMPLETED | OUTPATIENT
Start: 2023-10-04 | End: 2023-10-04

## 2023-10-04 RX ADMIN — GADOBUTROL 7 ML: 604.72 INJECTION INTRAVENOUS at 08:54

## 2023-12-12 DIAGNOSIS — G35 MS (MULTIPLE SCLEROSIS) (H): Primary | ICD-10-CM

## 2023-12-14 ENCOUNTER — VIRTUAL VISIT (OUTPATIENT)
Dept: NEUROLOGY | Facility: CLINIC | Age: 28
End: 2023-12-14
Attending: PSYCHIATRY & NEUROLOGY
Payer: COMMERCIAL

## 2023-12-14 ENCOUNTER — TELEPHONE (OUTPATIENT)
Dept: NEUROLOGY | Facility: CLINIC | Age: 28
End: 2023-12-14
Payer: COMMERCIAL

## 2023-12-14 DIAGNOSIS — Z51.81 THERAPEUTIC DRUG MONITORING: ICD-10-CM

## 2023-12-14 DIAGNOSIS — G35 MS (MULTIPLE SCLEROSIS) (H): Primary | ICD-10-CM

## 2023-12-14 DIAGNOSIS — E28.2 PCO (POLYCYSTIC OVARIES): ICD-10-CM

## 2023-12-14 DIAGNOSIS — F90.9 ATTENTION DEFICIT HYPERACTIVITY DISORDER (ADHD), UNSPECIFIED ADHD TYPE: ICD-10-CM

## 2023-12-14 NOTE — PROGRESS NOTES
Medication Therapy Management (MTM) Encounter    ASSESSMENT:                            Medication Adherence/Access: See below for considerations    MS:   Well-controlled with Rituximab infusions. Recommend patient continue with therapy and switch to [facility] per insurance coverage. Of note, rituximab can't be infused in patient's home, and patient will need to infuse at [facility] infusion suites.     ADHD:   Stable.     PCOS:   Stable with lifestyle modifications.     PLAN:                            Medication list updated     Continue current medications     [facility] will be contacting you about switching infusion centers    Follow-up: 6/14/24 @11am    SUBJECTIVE/OBJECTIVE:                          Robbi Bearden is a 28 year old female called for an initial visit. She was referred to me from Ana Kwok.      Reason for visit: Initial MTM - Infusion center site of care change    Allergies/ADRs: Reviewed in chart  Past Medical History: Reviewed in chart  Tobacco: She reports that she has been smoking. She has never used smokeless tobacco.Nicotine/Tobacco Cessation Plan:   Not discussed with patient today    Medication Adherence/Access: see below regarding infusions.     MS:   - Rituximab 500mg infusions every 6 months (next infusion 1/29/24)     Has been on rituximab for the last 5 years. No current symptoms or flares. Her MS was diagnosed about 5 years ago with one aggressive lesion (lost peripheral vision in both eyes suddenly).  Peripheral vision returned to normal when she started the infusions.     Patient no longer able to receive infusions at [facility] per insurance. She would like to go somewhere close to her house. She gets noticeably tired after the benadryl which is one of her pre-medications.     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     PCOS:   No longer on medication, was historically on  spironolactone.     Changing diet and exercising to regulate menses better and symptoms.     Today's Vitals: There were no vitals taken for this visit.  ----------------    I spent 21 minutes with this patient today. All changes were made via CPA with Ana Kwok. A copy of the visit note was provided to the patient's provider(s).    A summary of these recommendations was sent via [employer].    Daja Story, Pharm.D., MPH  Medication Therapy Management Pharmacist   [facility]  Direct Voicemail: [phone]    Telemedicine Visit Details  Type of service:  Telephone visit  Start Time:  9:03 AM  End Time: 9:24 AM     Medication Therapy Recommendations  No medication therapy recommendations to display

## 2023-12-14 NOTE — Clinical Note
12/14/2023       RE: Robbi Bearden  [address] [address] [address]     Dear Colleague,    Thank you for referring your patient, Robbi Bearden, to the [facility] MULTIPLE SCLEROSIS CLINIC [place] [facility]. Please see a copy of my visit note below.    Medication Therapy Management (MTM) Encounter    ASSESSMENT:                            Medication Adherence/Access: {adherencechoices:934761}    ***:  ***      PLAN:                            ***    Follow-up: 6/14/24 @11am    SUBJECTIVE/OBJECTIVE:                          Robbi Bearden is a 28 year old female called for an initial visit. She was referred to me from Ana Kwok.      Reason for visit: Initial MTM - Infusion center site of care change    Allergies/ADRs: Reviewed in chart  Past Medical History: Reviewed in chart  Tobacco: She reports that she has been smoking. She has never used smokeless tobacco.Nicotine/Tobacco Cessation Plan:   Not discussed with patient today    Medication Adherence/Access: {fumedadherence:506720}    MS:   - Rituximab 500mg infusions every 6 months (next infusion 1/29/24) *** Needs new orders placed!!!!!    Has been on rituximab for the last 5 years. No current symptoms or flares. Her MS was diagnosed about 5 years ago with one aggressive lesion (lost peripheral vision in both eyes suddenly).  Peripheral vision returned to normal when she started the infusions.     Patient no longer able to receive infusions at [facility]. 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     PCOS:   No longer on medication, was historically on spironolactone.     Changing diet and exercisign to regulate mesnes better.     Today's Vitals: There were no  "vitals taken for this visit.  ----------------  {JAMES?:923036}    I spent 21 minutes with this patient today. All changes were made via verbal approval with Ana Kwok. A copy of the visit note was provided to the patient's provider(s).    A summary of these recommendations was sent via [employer].    Daja Story, Pharm.D., MPH  Medication Therapy Management Pharmacist   [facility]  Direct Voicemail: [phone]    Telemedicine Visit Details  Type of service:  {telemedvisitmtm:472311::\"Telephone visit\"}  Start Time:  9:03 AM  End Time: 9:24 AM     Medication Therapy Recommendations  No medication therapy recommendations to display       Medication Therapy Management (MTM) Encounter    ASSESSMENT:                            Medication Adherence/Access: See below for considerations    MS:   Well-controlled with Rituximab infusions. Recommend patient continue with therapy and switch to [facility] per insurance coverage. Of note, rituximab can't be infused in patient's home, and     ADHD:   Stable.     PCOS:   Stable with lifestyle modifications.     PLAN:                            Continue current medications     [facility] will be contacting you about switching infusion centers    Follow-up: 6/14/24 @11am    SUBJECTIVE/OBJECTIVE:                          Robbi Bearden is a 28 year old female called for an initial visit. She was referred to me from Ana Kwok.      Reason for visit: Initial MTM - Infusion center site of care change    Allergies/ADRs: Reviewed in chart  Past Medical History: Reviewed in chart  Tobacco: She reports that she has been smoking. She has never used smokeless tobacco.Nicotine/Tobacco Cessation Plan:   Not discussed with patient today    Medication Adherence/Access: see below regarding infusions.     MS:   - Rituximab 500mg infusions every 6 months (next infusion 1/29/24) *** Needs new orders placed!!!!!    Has been on rituximab for the last 5 " years. No current symptoms or flares. Her MS was diagnosed about 5 years ago with one aggressive lesion (lost peripheral vision in both eyes suddenly).  Peripheral vision returned to normal when she started the infusions.     Patient no longer able to receive infusions at [facility]. She would like to go somewhere closer to her house.    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     PCOS:   No longer on medication, was historically on spironolactone.     Changing diet and exercisign to regulate mesnes better.     Today's Vitals: There were no vitals taken for this visit.  ----------------    I spent 21 minutes with this patient today. All changes were made via verbal approval with Ana Kwok. A copy of the visit note was provided to the patient's provider(s).    A summary of these recommendations was sent via [employer].    Daja Story, Pharm.D., MPH  Medication Therapy Management Pharmacist   [facility]  Direct Voicemail: [phone]    Telemedicine Visit Details  Type of service:  Telephone visit  Start Time:  9:03 AM  End Time: 9:24 AM     Medication Therapy Recommendations  No medication therapy recommendations to display         Again, thank you for allowing me to participate in the care of your patient.      Sincerely,    Daja Story, [facility]

## 2023-12-14 NOTE — PATIENT INSTRUCTIONS
"Recommendations from today's MTM visit:                                                    MTM (medication therapy management) is a service provided by a clinical pharmacist designed to help you get the most of out of your medicines.      Medication list updated     Continue current medications     [facility] will be contacting you about switching infusion centers    Follow-up: 6/14/24 @11am    It was great speaking with you today.  I value your experience and would be very thankful for your time in providing feedback in our clinic survey. In the next few days, you may receive an email or text message from [employer] with a link to a survey related to your  clinical pharmacist.\"     To schedule another MTM appointment, please call the clinic directly or you may call the MTM scheduling line at [phone] or toll-free at [phone].     My Clinical Pharmacist's contact information:                                                      Please feel free to contact me with any questions or concerns you have.      Daja Story, Pharm.D., MPH  Medication Therapy Management Pharmacist   [facility]  Direct Voicemail: [phone]  "

## 2023-12-18 RX ORDER — EPINEPHRINE 1 MG/ML
0.3 INJECTION, SOLUTION, CONCENTRATE INTRAVENOUS EVERY 5 MIN PRN
Status: CANCELLED | OUTPATIENT
Start: 2023-12-18

## 2023-12-18 RX ORDER — MEPERIDINE HYDROCHLORIDE 25 MG/ML
25 INJECTION INTRAMUSCULAR; INTRAVENOUS; SUBCUTANEOUS EVERY 30 MIN PRN
Status: CANCELLED | OUTPATIENT
Start: 2023-12-18

## 2023-12-18 RX ORDER — HEPARIN SODIUM (PORCINE) LOCK FLUSH IV SOLN 100 UNIT/ML 100 UNIT/ML
5 SOLUTION INTRAVENOUS
Status: CANCELLED | OUTPATIENT
Start: 2023-12-18

## 2023-12-18 RX ORDER — ALBUTEROL SULFATE 90 UG/1
1-2 AEROSOL, METERED RESPIRATORY (INHALATION)
Status: CANCELLED
Start: 2023-12-18

## 2023-12-18 RX ORDER — METHYLPREDNISOLONE SODIUM SUCCINATE 125 MG/2ML
125 INJECTION, POWDER, LYOPHILIZED, FOR SOLUTION INTRAMUSCULAR; INTRAVENOUS
Status: CANCELLED
Start: 2023-12-18

## 2023-12-18 RX ORDER — DIPHENHYDRAMINE HYDROCHLORIDE 50 MG/ML
50 INJECTION INTRAMUSCULAR; INTRAVENOUS
Status: CANCELLED
Start: 2023-12-18

## 2023-12-18 RX ORDER — ALBUTEROL SULFATE 0.83 MG/ML
2.5 SOLUTION RESPIRATORY (INHALATION)
Status: CANCELLED | OUTPATIENT
Start: 2023-12-18

## 2023-12-18 RX ORDER — HEPARIN SODIUM,PORCINE 10 UNIT/ML
5-20 VIAL (ML) INTRAVENOUS DAILY PRN
Status: CANCELLED | OUTPATIENT
Start: 2023-12-18

## 2023-12-19 NOTE — CONFIDENTIAL NOTE
Re-ordering therapy plan for Dr. Kwok to sign.     Daja Story, Pharm.D., MPH  Medication Therapy Management Pharmacist   [facility]  Direct Voicemail: [phone]

## 2023-12-22 RX ORDER — EPINEPHRINE 1 MG/ML
0.3 INJECTION, SOLUTION, CONCENTRATE INTRAVENOUS EVERY 5 MIN PRN
OUTPATIENT
Start: 2024-01-29

## 2023-12-22 RX ORDER — ALBUTEROL SULFATE 90 UG/1
1-2 AEROSOL, METERED RESPIRATORY (INHALATION)
Start: 2024-01-29

## 2023-12-22 RX ORDER — DIPHENHYDRAMINE HCL 25 MG
50 CAPSULE ORAL ONCE
Start: 2024-01-29

## 2023-12-22 RX ORDER — METHYLPREDNISOLONE SODIUM SUCCINATE 125 MG/2ML
125 INJECTION, POWDER, LYOPHILIZED, FOR SOLUTION INTRAMUSCULAR; INTRAVENOUS
Start: 2024-01-29

## 2023-12-22 RX ORDER — METHYLPREDNISOLONE SODIUM SUCCINATE 125 MG/2ML
125 INJECTION, POWDER, LYOPHILIZED, FOR SOLUTION INTRAMUSCULAR; INTRAVENOUS ONCE
OUTPATIENT
Start: 2024-01-29

## 2023-12-22 RX ORDER — ALBUTEROL SULFATE 0.83 MG/ML
2.5 SOLUTION RESPIRATORY (INHALATION)
OUTPATIENT
Start: 2024-01-29

## 2023-12-22 RX ORDER — ACETAMINOPHEN 325 MG/1
650 TABLET ORAL ONCE
Start: 2024-01-29

## 2023-12-22 RX ORDER — HEPARIN SODIUM (PORCINE) LOCK FLUSH IV SOLN 100 UNIT/ML 100 UNIT/ML
5 SOLUTION INTRAVENOUS
OUTPATIENT
Start: 2024-01-29

## 2023-12-22 RX ORDER — DIPHENHYDRAMINE HYDROCHLORIDE 50 MG/ML
50 INJECTION INTRAMUSCULAR; INTRAVENOUS
Start: 2024-01-29

## 2023-12-22 RX ORDER — HEPARIN SODIUM,PORCINE 10 UNIT/ML
5-20 VIAL (ML) INTRAVENOUS DAILY PRN
OUTPATIENT
Start: 2024-01-29

## 2023-12-22 RX ORDER — MEPERIDINE HYDROCHLORIDE 25 MG/ML
25 INJECTION INTRAMUSCULAR; INTRAVENOUS; SUBCUTANEOUS EVERY 30 MIN PRN
OUTPATIENT
Start: 2024-01-29

## 2024-01-02 ENCOUNTER — DOCUMENTATION ONLY (OUTPATIENT)
Dept: NEUROLOGY | Facility: CLINIC | Age: 29
End: 2024-01-02
Payer: COMMERCIAL

## 2024-01-02 NOTE — PROGRESS NOTES
Approval for Truxima has been received from [facility], authorization valid from 12/14/2023 through 12/14/2024.  Subhash Perry EMT 01/02/2024 2:29PM

## 2024-01-03 ENCOUNTER — CARE COORDINATION (OUTPATIENT)
Dept: PHARMACY | Facility: CLINIC | Age: 29
End: 2024-01-03
Payer: COMMERCIAL

## 2024-01-03 NOTE — PROGRESS NOTES
Referred to [facility]    Robbi Bearden, 1995  Medication (name, frequency and route):  Rapid Truxima q6months   Start of Care Date: Monday 1/29/24 (Confirmed with patient)  Infusion location: [facility]  Skilled Nursing will be provided by: [facility]  @ [phone]    Roopa Kaufman RN

## 2024-01-29 ENCOUNTER — DOCUMENTATION ONLY (OUTPATIENT)
Dept: PHARMACY | Facility: CLINIC | Age: 29
End: 2024-01-29

## 2024-01-29 ENCOUNTER — LAB REQUISITION (OUTPATIENT)
Dept: LAB | Facility: CLINIC | Age: 29
End: 2024-01-29

## 2024-01-29 DIAGNOSIS — G35 MULTIPLE SCLEROSIS (H): ICD-10-CM

## 2024-01-29 LAB
BASOPHILS # BLD AUTO: 0 10E3/UL (ref 0–0.2)
BASOPHILS NFR BLD AUTO: 1 %
CD19 B CELL COMMENT: ABNORMAL
CD19 CELLS # BLD: <1 CELLS/UL (ref 107–698)
CD19 CELLS NFR BLD: <1 % (ref 6–27)
EOSINOPHIL # BLD AUTO: 0.1 10E3/UL (ref 0–0.7)
EOSINOPHIL NFR BLD AUTO: 1 %
ERYTHROCYTE [DISTWIDTH] IN BLOOD BY AUTOMATED COUNT: 12.2 % (ref 10–15)
HCT VFR BLD AUTO: 44.1 % (ref 35–47)
HGB BLD-MCNC: 15.4 G/DL (ref 11.7–15.7)
HOLD SPECIMEN: NORMAL
IMM GRANULOCYTES # BLD: 0 10E3/UL
IMM GRANULOCYTES NFR BLD: 0 %
LYMPHOCYTES # BLD AUTO: 1.5 10E3/UL (ref 0.8–5.3)
LYMPHOCYTES NFR BLD AUTO: 25 %
MCH RBC QN AUTO: 28.3 PG (ref 26.5–33)
MCHC RBC AUTO-ENTMCNC: 34.9 G/DL (ref 31.5–36.5)
MCV RBC AUTO: 81 FL (ref 78–100)
MONOCYTES # BLD AUTO: 0.5 10E3/UL (ref 0–1.3)
MONOCYTES NFR BLD AUTO: 9 %
NEUTROPHILS # BLD AUTO: 3.7 10E3/UL (ref 1.6–8.3)
NEUTROPHILS NFR BLD AUTO: 64 %
NRBC # BLD AUTO: 0 10E3/UL
NRBC BLD AUTO-RTO: 0 /100
PLATELET # BLD AUTO: 347 10E3/UL (ref 150–450)
RBC # BLD AUTO: 5.44 10E6/UL (ref 3.8–5.2)
WBC # BLD AUTO: 5.8 10E3/UL (ref 4–11)

## 2024-01-29 PROCEDURE — 85025 COMPLETE CBC W/AUTO DIFF WBC: CPT | Performed by: PSYCHIATRY & NEUROLOGY

## 2024-01-29 PROCEDURE — 82784 ASSAY IGA/IGD/IGG/IGM EACH: CPT | Performed by: PSYCHIATRY & NEUROLOGY

## 2024-01-29 PROCEDURE — 86355 B CELLS TOTAL COUNT: CPT | Performed by: PSYCHIATRY & NEUROLOGY

## 2024-01-29 NOTE — PROGRESS NOTES
Skilled Nurse visit in the [facility]fusion Site to administer Entyvio.  No recent elevated temperature, fever, chills, productive cough, coughing for 3 weeks or longer or hemoptysis, abnormal vital signs, night sweats, chest pain. No  decrease in your appetite, unexplained weight loss or fatigue.  No other new onset medical symptoms.  Current weight 165 lbs.  Peripheral IVleft AC, 1 attempt. Pre medicated with tylenol, benadryl and methylpredisolone. Labs drawn today. Infusion completed without complication or reaction. Pt reports therapy is effective in managing symptoms related to therapy.

## 2024-01-30 ENCOUNTER — DOCUMENTATION ONLY (OUTPATIENT)
Dept: NEUROLOGY | Facility: CLINIC | Age: 29
End: 2024-01-30
Payer: COMMERCIAL

## 2024-01-30 ENCOUNTER — MEDICAL CORRESPONDENCE (OUTPATIENT)
Dept: HEALTH INFORMATION MANAGEMENT | Facility: CLINIC | Age: 29
End: 2024-01-30
Payer: COMMERCIAL

## 2024-01-30 LAB — IGG SERPL-MCNC: 619 MG/DL (ref 610–1616)

## 2024-01-30 NOTE — PROGRESS NOTES
Care plan/Treatment orders have been received from [facility], orders have been placed in Dr. Kwok's folder for review and signature.   Subhash Perry EMT 01/30/2024 9:28AM

## 2024-01-30 NOTE — PROGRESS NOTES
Care plan/treatment orders have been signed and faxed back at [phone].  Subhash HIDALGO 01/30/2024 3:11PM

## 2024-01-31 ENCOUNTER — DOCUMENTATION ONLY (OUTPATIENT)
Dept: NEUROLOGY | Facility: CLINIC | Age: 29
End: 2024-01-31
Payer: COMMERCIAL

## 2024-01-31 NOTE — PROGRESS NOTES
Prescriber orders have been received from [facility], orders placed in Dr. Kwok's folder for review and signature.   Subhash Perry EMT 01/31/2024 8:49AM

## 2024-02-02 ENCOUNTER — MEDICAL CORRESPONDENCE (OUTPATIENT)
Dept: HEALTH INFORMATION MANAGEMENT | Facility: CLINIC | Age: 29
End: 2024-02-02
Payer: COMMERCIAL

## 2024-02-05 NOTE — PROGRESS NOTES
Prescriber orders have been signed and faxed back at [phone].  Subhash HIDALGO 02/05/2024 8:19AM    normal

## 2024-02-14 ENCOUNTER — TELEPHONE (OUTPATIENT)
Dept: NEUROLOGY | Facility: CLINIC | Age: 29
End: 2024-02-14
Payer: COMMERCIAL

## 2024-02-14 NOTE — TELEPHONE ENCOUNTER
Pt infused rituximab on 1/29/24 with [facility]. Requesting lower benadryl dose for next infusion, due end of July. Reports drowsiness with 50 mg and no previous infusion reactions. If OK, please update therapy plan and I will let I know.     Mirella Murrieta RN

## 2024-02-15 NOTE — TELEPHONE ENCOUNTER
Orders placed in Dr. Kwok's folder for review and signature.   Subhash Perry EMT 02/15/2024 12:18PM

## 2024-02-20 ENCOUNTER — MEDICAL CORRESPONDENCE (OUTPATIENT)
Dept: HEALTH INFORMATION MANAGEMENT | Facility: CLINIC | Age: 29
End: 2024-02-20
Payer: COMMERCIAL

## 2024-02-20 NOTE — TELEPHONE ENCOUNTER
Prescriber orders have been signed and faxed back at [phone].  Subhash HIDALGO 02/20/2024 10:13AM

## 2024-03-04 LAB
ABO (EXTERNAL): NORMAL
HEPATITIS B SURFACE ANTIGEN (EXTERNAL): NEGATIVE
HIV1+2 AB SERPL QL IA: NONREACTIVE
RH (EXTERNAL): POSITIVE
RUBELLA ANTIBODY IGG (EXTERNAL): NORMAL
VDRL (SYPHILIS) (EXTERNAL): NONREACTIVE

## 2024-06-06 ENCOUNTER — HOSPITAL ENCOUNTER (INPATIENT)
Facility: CLINIC | Age: 29
Setting detail: SURGERY ADMIT
End: 2024-06-06
Attending: OBSTETRICS & GYNECOLOGY | Admitting: OBSTETRICS & GYNECOLOGY
Payer: COMMERCIAL

## 2024-06-14 ENCOUNTER — VIRTUAL VISIT (OUTPATIENT)
Dept: NEUROLOGY | Facility: CLINIC | Age: 29
End: 2024-06-14
Attending: PSYCHIATRY & NEUROLOGY

## 2024-06-14 DIAGNOSIS — G35 MS (MULTIPLE SCLEROSIS) (H): Primary | ICD-10-CM

## 2024-06-14 NOTE — Clinical Note
6/14/2024       RE: Robbi Don  [address] [address]     Dear Colleague,    Thank you for referring your patient, Robbi Don, to the [facility] MULTIPLE SCLEROSIS CLINIC [facility]. Please see a copy of my visit note below.    Medication Therapy Management (MTM) Encounter    ASSESSMENT:                            Medication Adherence/Access: See below for considerations    MS:   Well-controlled with Rituximab infusions. Patient is currently pregnant and has upcoming appointment to discuss MS treatment plan during pregnancy with Dr. Kwok. Will defer treatment recommendations to visit with neurologist. Of note, studies do suggest that breastfeeding while on rituximab is likely safe.    PLAN:                            We discussed that you have an upcoming appointment with Dr. Kwok to discuss your treatment plan since you are currently pregnant.   We also reviewed that data does tend to show Rituximab levels are generally very low in breast milk and that the medication is likely to be partially destroyed in the baby's GI tract, leading to very minimal absorption of the medication from baby. General recommendations are that breastfeeding is safe during Rituximab treatments but please confirm that Dr. Kwok is okay with this at your next visit with her.     Follow-up: 12/9 @11am via phone call     SUBJECTIVE/OBJECTIVE:                          Robbi Bearden is a 29 year old female called for a follow up visit. She was referred to me from Dr. Kwok.      Reason for visit: Infusion Follow up     Allergies/ADRs: Reviewed in chart  Past Medical History: Reviewed in chart  Tobacco: She reports that she has been smoking. She has never used smokeless tobacco.Nicotine/Tobacco Cessation Plan:   Not discussed with patient today    Medication Adherence/Access: No issues identified.     MS:   - Rituximab 500mg infusions every 6 months.  Last infusion was 1/29/24. Currently infusing at [facility]. She is pregnant, about 19 weeks. Overall things have been going well. She has questions on next steps and how to approach treatment during pregnancy as well as when she is breastfeeding. Robbi has an upcoming appointment to discuss things with Dr. Kwok at the end of the month.     Today's Vitals: There were no vitals taken for this visit.  ----------------    I spent 10 minutes with this patient today.  A copy of the visit note was provided to the patient's provider(s).    A summary of these recommendations was sent via [employer].    Daja Story, Pharm.D., MPH  Medication Therapy Management Pharmacist   [facility]    Telemedicine Visit Details  Type of service:  Telephone visit  Start Time:  11:10 AM  End Time: 11:20 AM     Medication Therapy Recommendations  No medication therapy recommendations to display         Again, thank you for allowing me to participate in the care of your patient.      Sincerely,    Daja Story [facility]

## 2024-06-14 NOTE — PROGRESS NOTES
Medication Therapy Management (MTM) Encounter    ASSESSMENT:                            Medication Adherence/Access: See below for considerations    MS:   Well-controlled with Rituximab infusions. Patient is currently pregnant and has upcoming appointment to discuss MS treatment plan during pregnancy with Dr. Kwok. Will defer treatment recommendations to visit with neurologist. Of note, studies do suggest that breastfeeding while on rituximab is likely safe.    PLAN:                            We discussed that you have an upcoming appointment with Dr. Kwok to discuss your treatment plan since you are currently pregnant.   We also reviewed that data does tend to show Rituximab levels are generally very low in breast milk and that the medication is likely to be partially destroyed in the baby's GI tract, leading to very minimal absorption of the medication from baby. General recommendations are that breastfeeding is safe during Rituximab treatments but please confirm that Dr. Kwok is okay with this at your next visit with her.     Follow-up: 12/9 @11am via phone call     SUBJECTIVE/OBJECTIVE:                          Robbi Bearden is a 29 year old female called for a follow up visit. She was referred to me from Dr. Kwok.      Reason for visit: Infusion Follow up     Allergies/ADRs: Reviewed in chart  Past Medical History: Reviewed in chart  Tobacco: She reports that she has been smoking. She has never used smokeless tobacco.Nicotine/Tobacco Cessation Plan:   Not discussed with patient today    Medication Adherence/Access: No issues identified.     MS:   - Rituximab 500mg infusions every 6 months. Last infusion was 1/29/24. Currently infusing at [facility]. She is pregnant, about 19 weeks. Overall things have been going well. She has questions on next steps and how to approach treatment during pregnancy as well as when she is breastfeeding. Robbi has an upcoming appointment to discuss things with  Dr. Kwok at the end of the month.     Today's Vitals: There were no vitals taken for this visit.  ----------------    I spent 10 minutes with this patient today.  A copy of the visit note was provided to the patient's provider(s).    A summary of these recommendations was sent via [employer].    Lisa AvilaD., MPH  Medication Therapy Management Pharmacist   [facility] Neurology Clinic    Telemedicine Visit Details  Type of service:  Telephone visit  Start Time:  11:10 AM  End Time: 11:20 AM     Medication Therapy Recommendations  No medication therapy recommendations to display

## 2024-06-14 NOTE — PATIENT INSTRUCTIONS
"Recommendations from today's MTM visit:                                                       We discussed that you have an upcoming appointment with Dr. Kwok to discuss your treatment plan since you are currently pregnant.   We also reviewed that data does tend to show Rituximab levels are generally very low in breast milk and that the medication is likely to be partially destroyed in the baby's GI tract, leading to very minimal absorption of the medication from baby. General recommendations are that breastfeeding is safe during Rituximab treatments but please confirm that Dr. Kwok is okay with this at your next visit with her.     Follow-up: 12/9 @11am via phone call     It was great speaking with you today.  I value your experience and would be very thankful for your time in providing feedback in our clinic survey. In the next few days, you may receive an email or text message from [employer] with a link to a survey related to your  clinical pharmacist.\"     To schedule another MTM appointment, please call the clinic directly or you may call the MTM scheduling line at [phone].    My Clinical Pharmacist's contact information:                                                      Please feel free to contact me with any questions or concerns you have.      Daja Story, Pharm.D., MPH  Medication Therapy Management Pharmacist   [facility]   " Loss

## 2024-06-28 ENCOUNTER — OFFICE VISIT (OUTPATIENT)
Dept: NEUROLOGY | Facility: CLINIC | Age: 29
End: 2024-06-28
Attending: PSYCHIATRY & NEUROLOGY
Payer: COMMERCIAL

## 2024-06-28 VITALS
BODY MASS INDEX: 37.34 KG/M2 | SYSTOLIC BLOOD PRESSURE: 109 MMHG | DIASTOLIC BLOOD PRESSURE: 74 MMHG | OXYGEN SATURATION: 95 % | HEART RATE: 111 BPM | WEIGHT: 191.2 LBS

## 2024-06-28 DIAGNOSIS — D84.821 IMMUNOSUPPRESSION DUE TO DRUG THERAPY (H): ICD-10-CM

## 2024-06-28 DIAGNOSIS — R05.3 CHRONIC COUGH: ICD-10-CM

## 2024-06-28 DIAGNOSIS — E55.9 VITAMIN D DEFICIENCY: ICD-10-CM

## 2024-06-28 DIAGNOSIS — Z79.899 IMMUNOSUPPRESSION DUE TO DRUG THERAPY (H): ICD-10-CM

## 2024-06-28 DIAGNOSIS — Z51.81 THERAPEUTIC DRUG MONITORING: ICD-10-CM

## 2024-06-28 DIAGNOSIS — G35 MS (MULTIPLE SCLEROSIS) (H): Primary | ICD-10-CM

## 2024-06-28 PROCEDURE — 99214 OFFICE O/P EST MOD 30 MIN: CPT | Performed by: PSYCHIATRY & NEUROLOGY

## 2024-06-28 PROCEDURE — G2211 COMPLEX E/M VISIT ADD ON: HCPCS | Performed by: PSYCHIATRY & NEUROLOGY

## 2024-06-28 RX ORDER — AMOXICILLIN 500 MG/1
1000 CAPSULE ORAL 3 TIMES DAILY
Qty: 42 CAPSULE | Refills: 0 | Status: ON HOLD | OUTPATIENT
Start: 2024-06-28 | End: 2024-09-26

## 2024-06-28 RX ORDER — PREDNISONE 20 MG/1
40 TABLET ORAL
COMMUNITY
Start: 2024-06-03 | End: 2024-06-29

## 2024-06-28 ASSESSMENT — PAIN SCALES - GENERAL: PAINLEVEL: NO PAIN (0)

## 2024-06-28 NOTE — NURSING NOTE
Chief Complaint   Patient presents with    MS    RECHECK     MS follow up      Vitals were taken and medications were reconciled.   Subhash Perry, EMT  8:00 AM

## 2024-06-28 NOTE — PROGRESS NOTES
Date of Service: 6/28/2024    [facility] Neurology   MS Clinic Follow-up     Subjective: 29-year-old woman with a history of PCOS who presents in follow-up for multiple sclerosis.    No discrete new symptoms related to multiple sclerosis.    She did receive rituximab in January.  She has been content with the half dose that we have adjusted to.  She believes that she became pregnant in late February.  She is currently 20 weeks pregnant.    For the past couple months she has struggled with a persistent cough.  She has been evaluated by primary care, urgent care and even in the ER.  She recently had a CT chest that was negative for pneumonia.  However, she continues to have a cough, tachycardia, and her white blood cell count is elevated.  She easily becomes short of breath when she tries any physical activity.    She does plan to breast-feed postpartum.  She is interested in learning what the typical management is around pregnancy.    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    DMD's:   Rituximab 10/25/18-present, last dose 6/8/2021 of 1000 mg q6mo   rtx 500 mg 12/29/21 - present, LD 1/29/2024    Vitamin D: 50,000 international units every week      No Known Allergies    Current Outpatient Medications   Medication Sig Dispense Refill    predniSONE (DELTASONE) 20 MG tablet Take 40 mg by mouth      amphetamine-dextroamphetamine (ADDERALL) 10 MG tablet Take 1 tablet (10 mg) by mouth (Patient not taking: Reported on 6/28/2024)      riTUXimab-abbs 500 mg Inject 500 mLs (500 mg) into the vein every 6 months (Patient not taking: Reported on 6/28/2024)       No current facility-administered medications for this visit.        Past medical, surgical, social and family history was personally reviewed. Pertinent details noted above.     Physical Examination:   /74 (BP Location: Left arm, Patient Position: Sitting, Cuff Size: Adult Regular)   Pulse 111   Wt 86.7 kg (191 lb 3.2 oz)    SpO2 95%   BMI 37.34 kg/m      General: no acute distress  Cranial nerves:   VFFC  PERRL w/no RAPD  EOM full w/no FLOR   Face symmetric  Hearing intact  No dysarthria   Motor:   Tone is normal   Bulk is normal     R L  Deltoid  5 5  Biceps  5 5  Triceps 5 5  Wrist ext 5 5  Finger ext 5 5  Finger abd 5 5    Hip flexion 5 5  Knee flexion 5 5  Knee ext 5 5  Ankle d/f 5 5    Reflexes: 2+ and symmetric throughout, babinski absent bilaterally  Sensory: vibration is normal in the toes, JPS normal in the toes   Romberg is absent  Coordination: no ataxia or dysmetria  Gait: normal base and stride, tandem gait is intact, able to balance on one foot and hop x 5 bilaterally, able to get up from the chair with a single leg    Tests/Imaging:   JCV 3.2  D 55 1/2019   30 10/2020    Alc 1000  CD19 < 1  Igg 619    MRI brain   9/14/18 - there are a number of periventricular demyelinating lesions noted predominantly off the atria and occipital poles bilaterally but more prominent on the right, all of the lesions enhance, there is typical ring-enhancement noted that is more apparent on the coronal views whereas enhancement pattern on axial views appears more heterogeneous  9/30/18 - personally compared to the initial study with pt, slight growth in lesions on the right side  12/21/18 - reduction in size of lesions, resolution of gadolinium enhancement, one new lesion off right atrium punctate in size but gd-  11/2019 - no new lesions, gd-  8/2020 - no new lesions, gd-  8/2021 - no new lesions, gd-   8/2022 - no new lesions, gd-   10/2023 - no new lesions, gd-    MRI cervical spine  10/2018 - personally reviewed, no definite lesions, gd-, poor quality  8/2020 - no new lesions, gd-  8/2022 - no new lesions, deg changes noted  10/2023 - no new lesions, gd-     MRI thoracic spine  10/2018 - personally reviewed, no definite lesions though possible small eccentric lesions present, gd-, small syrinx  8/2020 - no new lesions, gd-, stable  tatox   10/2023 - no lesions    Assessment: 29-year-old woman with relapsing remitting multiple sclerosis who remains clinically stable on rituximab     She has been struggling with a chronic cough.  White blood cells are elevated.  She is having difficulty tolerating physical activity.  She is not having fevers, but given that she is immune suppressed I am concerned for chronic bronchitis.  Recommended a course of amoxicillin.  She was agreeable with this plan.  Will recheck her blood work in 2 weeks to ensure response.  She has been referred to pulmonology, but her appointment is not until August.    With regard to multiple sclerosis I have recommended that she plan for an MRI 1 month postpartum.  I would like to meet with her after that study.  We will discuss resuming rituximab at that visit.    We discussed how maintaining an adequate level of vitamin D is important during pregnancy to decrease her baby's risk of multiple sclerosis.  Will check her vitamin D level with her next blood work.  She is currently taking 2000 international units daily.      Plan:   -MRI 1 month postpartum  - Amoxicillin course  - Follow-up after MRI    Note was completed with the assistance of Dragon Fluency software which can often result in accidental word substitutions.   The longitudinal plan of care for the diagnosis(es)/condition(s) as documented were addressed during this visit. Due to the added complexity in care, I will continue to support Robbi in the subsequent management and with ongoing continuity of care.    A total of 30 minutes on the date of service were spent in the care of this patient.   Ana Kwok MD on 6/28/2024 at 8:07 AM

## 2024-06-28 NOTE — PATIENT INSTRUCTIONS
Try amoxicillin for one week   Blood work to bed one in 2 weeks     Mri 1 month post partum   Repeat blood work at time of mri     Follow up after MRI   We will talk about resuming rituximab at that visit

## 2024-06-28 NOTE — LETTER
6/28/2024       RE: Robbi Don  [id number] [place] [address]     Dear Colleague,    Thank you for referring your patient, Robbi Don, to the [facility] [place] at [facility]. Please see a copy of my visit note below.    Date of Service: 6/28/2024    [facility] Neurology   MS Clinic Follow-up     Subjective: 29-year-old woman with a history of PCOS who presents in follow-up for multiple sclerosis.    No discrete new symptoms related to multiple sclerosis.    She did receive rituximab in January.  She has been content with the half dose that we have adjusted to.  She believes that she became pregnant in late February.  She is currently 20 weeks pregnant.    For the past couple months she has struggled with a persistent cough.  She has been evaluated by primary care, urgent care and even in the ER.  She recently had a CT chest that was negative for pneumonia.  However, she continues to have a cough, tachycardia, and her white blood cell count is elevated.  She easily becomes short of breath when she tries any physical activity.    She does plan to breast-feed postpartum.  She is interested in learning what the typical management is around pregnancy.    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    DMD's:   Rituximab 10/25/18-present, last dose 6/8/2021 of 1000 mg q6mo   rtx 500 mg 12/29/21 - present, LD 1/29/2024    Vitamin D: 50,000 international units every week      No Known Allergies    Current Outpatient Medications   Medication Sig Dispense Refill    predniSONE (DELTASONE) 20 MG tablet Take 40 mg by mouth      amphetamine-dextroamphetamine (ADDERALL) 10 MG tablet Take 1 tablet (10 mg) by mouth (Patient not taking: Reported on 6/28/2024)      riTUXimab-abbs 500 mg Inject 500 mLs (500 mg) into the vein every 6 months (Patient not taking: Reported on 6/28/2024)        No current facility-administered medications for this visit.        Past medical, surgical, social and family history was personally reviewed. Pertinent details noted above.     Physical Examination:   /74 (BP Location: Left arm, Patient Position: Sitting, Cuff Size: Adult Regular)   Pulse 111   Wt 86.7 kg (191 lb 3.2 oz)   SpO2 95%   BMI 37.34 kg/m      General: no acute distress  Cranial nerves:   VFFC  PERRL w/no RAPD  EOM full w/no FLOR   Face symmetric  Hearing intact  No dysarthria   Motor:   Tone is normal   Bulk is normal     R L  Deltoid  5 5  Biceps  5 5  Triceps 5 5  Wrist ext 5 5  Finger ext 5 5  Finger abd 5 5    Hip flexion 5 5  Knee flexion 5 5  Knee ext 5 5  Ankle d/f 5 5    Reflexes: 2+ and symmetric throughout, babinski absent bilaterally  Sensory: vibration is normal in the toes, JPS normal in the toes   Romberg is absent  Coordination: no ataxia or dysmetria  Gait: normal base and stride, tandem gait is intact, able to balance on one foot and hop x 5 bilaterally, able to get up from the chair with a single leg    Tests/Imaging:   JCV 3.2  D 55 1/2019   30 10/2020    Alc 1000  CD19 < 1  Igg 619    MRI brain   9/14/18 - there are a number of periventricular demyelinating lesions noted predominantly off the atria and occipital poles bilaterally but more prominent on the right, all of the lesions enhance, there is typical ring-enhancement noted that is more apparent on the coronal views whereas enhancement pattern on axial views appears more heterogeneous  9/30/18 - personally compared to the initial study with pt, slight growth in lesions on the right side  12/21/18 - reduction in size of lesions, resolution of gadolinium enhancement, one new lesion off right atrium punctate in size but gd-  11/2019 - no new lesions, gd-  8/2020 - no new lesions, gd-  8/2021 - no new lesions, gd-   8/2022 - no new lesions, gd-   10/2023 - no new lesions, gd-    MRI cervical spine  10/2018 - personally  reviewed, no definite lesions, gd-, poor quality  8/2020 - no new lesions, gd-  8/2022 - no new lesions, deg changes noted  10/2023 - no new lesions, gd-     MRI thoracic spine  10/2018 - personally reviewed, no definite lesions though possible small eccentric lesions present, gd-, small syrinx  8/2020 - no new lesions, gd-, stable syrinx   10/2023 - no lesions    Assessment: 29-year-old woman with relapsing remitting multiple sclerosis who remains clinically stable on rituximab     She has been struggling with a chronic cough.  White blood cells are elevated.  She is having difficulty tolerating physical activity.  She is not having fevers, but given that she is immune suppressed I am concerned for chronic bronchitis.  Recommended a course of amoxicillin.  She was agreeable with this plan.  Will recheck her blood work in 2 weeks to ensure response.  She has been referred to pulmonology, but her appointment is not until August.    With regard to multiple sclerosis I have recommended that she plan for an MRI 1 month postpartum.  I would like to meet with her after that study.  We will discuss resuming rituximab at that visit.    We discussed how maintaining an adequate level of vitamin D is important during pregnancy to decrease her baby's risk of multiple sclerosis.  Will check her vitamin D level with her next blood work.  She is currently taking 2000 international units daily.      Plan:   -MRI 1 month postpartum  - Amoxicillin course  - Follow-up after MRI    Note was completed with the assistance of Dragon Fluency software which can often result in accidental word substitutions.   The longitudinal plan of care for the diagnosis(es)/condition(s) as documented were addressed during this visit. Due to the added complexity in care, I will continue to support Nemike in the subsequent management and with ongoing continuity of care.    A total of 30 minutes on the date of service were spent in the care of this patient.    Ana Kwok MD on 6/28/2024 at 8:07 AM            Again, thank you for allowing me to participate in the care of your patient.      Sincerely,    Ana Kwok MD

## 2024-07-12 ENCOUNTER — LAB (OUTPATIENT)
Dept: LAB | Facility: CLINIC | Age: 29
End: 2024-07-12
Payer: COMMERCIAL

## 2024-07-12 DIAGNOSIS — Z51.81 THERAPEUTIC DRUG MONITORING: ICD-10-CM

## 2024-07-12 DIAGNOSIS — E55.9 VITAMIN D DEFICIENCY: ICD-10-CM

## 2024-07-12 DIAGNOSIS — G35 MS (MULTIPLE SCLEROSIS) (H): ICD-10-CM

## 2024-07-12 LAB
BASOPHILS # BLD AUTO: 0.1 10E3/UL (ref 0–0.2)
BASOPHILS NFR BLD AUTO: 0 %
EOSINOPHIL # BLD AUTO: 0.1 10E3/UL (ref 0–0.7)
EOSINOPHIL NFR BLD AUTO: 0 %
ERYTHROCYTE [DISTWIDTH] IN BLOOD BY AUTOMATED COUNT: 13.4 % (ref 10–15)
HCT VFR BLD AUTO: 32.8 % (ref 35–47)
HGB BLD-MCNC: 11.2 G/DL (ref 11.7–15.7)
IMM GRANULOCYTES # BLD: 0.4 10E3/UL
IMM GRANULOCYTES NFR BLD: 3 %
LYMPHOCYTES # BLD AUTO: 1.7 10E3/UL (ref 0.8–5.3)
LYMPHOCYTES NFR BLD AUTO: 12 %
MCH RBC QN AUTO: 27.3 PG (ref 26.5–33)
MCHC RBC AUTO-ENTMCNC: 34.1 G/DL (ref 31.5–36.5)
MCV RBC AUTO: 80 FL (ref 78–100)
MONOCYTES # BLD AUTO: 1.4 10E3/UL (ref 0–1.3)
MONOCYTES NFR BLD AUTO: 10 %
NEUTROPHILS # BLD AUTO: 10.5 10E3/UL (ref 1.6–8.3)
NEUTROPHILS NFR BLD AUTO: 75 %
NRBC # BLD AUTO: 0 10E3/UL
NRBC BLD AUTO-RTO: 0 /100
PLATELET # BLD AUTO: 331 10E3/UL (ref 150–450)
RBC # BLD AUTO: 4.1 10E6/UL (ref 3.8–5.2)
WBC # BLD AUTO: 14.1 10E3/UL (ref 4–11)

## 2024-07-12 PROCEDURE — 82306 VITAMIN D 25 HYDROXY: CPT

## 2024-07-12 PROCEDURE — 82784 ASSAY IGA/IGD/IGG/IGM EACH: CPT

## 2024-07-12 PROCEDURE — 36415 COLL VENOUS BLD VENIPUNCTURE: CPT

## 2024-07-12 PROCEDURE — 86355 B CELLS TOTAL COUNT: CPT

## 2024-07-12 PROCEDURE — 85025 COMPLETE CBC W/AUTO DIFF WBC: CPT

## 2024-07-13 LAB
CD19 B CELL COMMENT: ABNORMAL
CD19 CELLS # BLD: <1 CELLS/UL (ref 107–698)
CD19 CELLS NFR BLD: <1 % (ref 6–27)
VIT D+METAB SERPL-MCNC: 40 NG/ML (ref 20–50)

## 2024-07-15 LAB — IGG SERPL-MCNC: 413 MG/DL (ref 610–1616)

## 2024-07-19 LAB — SCANNED LAB RESULT: ABNORMAL

## 2024-09-05 ENCOUNTER — ENROLLMENT (OUTPATIENT)
Dept: HOME HEALTH SERVICES | Facility: HOME HEALTH | Age: 29
End: 2024-09-05
Payer: COMMERCIAL

## 2024-09-14 ENCOUNTER — HEALTH MAINTENANCE LETTER (OUTPATIENT)
Age: 29
End: 2024-09-14

## 2024-09-26 ENCOUNTER — HOSPITAL ENCOUNTER (OUTPATIENT)
Facility: CLINIC | Age: 29
Discharge: HOME | End: 2024-09-26
Attending: OBSTETRICS & GYNECOLOGY | Admitting: OBSTETRICS & GYNECOLOGY
Payer: COMMERCIAL

## 2024-09-26 ENCOUNTER — HOSPITAL ENCOUNTER (OUTPATIENT)
Facility: CLINIC | Age: 29
End: 2024-09-26
Admitting: OBSTETRICS & GYNECOLOGY
Payer: COMMERCIAL

## 2024-09-26 VITALS
DIASTOLIC BLOOD PRESSURE: 65 MMHG | HEART RATE: 107 BPM | RESPIRATION RATE: 16 BRPM | SYSTOLIC BLOOD PRESSURE: 108 MMHG | TEMPERATURE: 98.1 F

## 2024-09-26 LAB
ALBUMIN MFR UR ELPH: 7 MG/DL
ALBUMIN SERPL BCG-MCNC: 3.2 G/DL (ref 3.5–5.2)
ALP SERPL-CCNC: 105 U/L (ref 40–150)
ALT SERPL W P-5'-P-CCNC: 10 U/L (ref 0–50)
ANION GAP SERPL CALCULATED.3IONS-SCNC: 12 MMOL/L (ref 7–15)
AST SERPL W P-5'-P-CCNC: 11 U/L (ref 0–45)
BILIRUB SERPL-MCNC: 0.4 MG/DL
BUN SERPL-MCNC: 7.2 MG/DL (ref 6–20)
CALCIUM SERPL-MCNC: 8.8 MG/DL (ref 8.8–10.4)
CHLORIDE SERPL-SCNC: 107 MMOL/L (ref 98–107)
CREAT SERPL-MCNC: 0.57 MG/DL (ref 0.51–0.95)
CREAT UR-MCNC: 48.8 MG/DL
EGFRCR SERPLBLD CKD-EPI 2021: >90 ML/MIN/1.73M2
ERYTHROCYTE [DISTWIDTH] IN BLOOD BY AUTOMATED COUNT: 14.5 % (ref 10–15)
GLUCOSE SERPL-MCNC: 100 MG/DL (ref 70–99)
HCO3 SERPL-SCNC: 19 MMOL/L (ref 22–29)
HCT VFR BLD AUTO: 38.3 % (ref 35–47)
HGB BLD-MCNC: 12.9 G/DL (ref 11.7–15.7)
MCH RBC QN AUTO: 26.6 PG (ref 26.5–33)
MCHC RBC AUTO-ENTMCNC: 33.7 G/DL (ref 31.5–36.5)
MCV RBC AUTO: 79 FL (ref 78–100)
PLATELET # BLD AUTO: 274 10E3/UL (ref 150–450)
POTASSIUM SERPL-SCNC: 4 MMOL/L (ref 3.4–5.3)
PROT SERPL-MCNC: 5.6 G/DL (ref 6.4–8.3)
PROT/CREAT 24H UR: 0.14 MG/MG CR (ref 0–0.2)
RBC # BLD AUTO: 4.85 10E6/UL (ref 3.8–5.2)
SODIUM SERPL-SCNC: 138 MMOL/L (ref 135–145)
WBC # BLD AUTO: 11.5 10E3/UL (ref 4–11)

## 2024-09-26 PROCEDURE — 84075 ASSAY ALKALINE PHOSPHATASE: CPT | Performed by: OBSTETRICS & GYNECOLOGY

## 2024-09-26 PROCEDURE — G0463 HOSPITAL OUTPT CLINIC VISIT: HCPCS | Mod: 25

## 2024-09-26 PROCEDURE — 59025 FETAL NON-STRESS TEST: CPT

## 2024-09-26 PROCEDURE — 85041 AUTOMATED RBC COUNT: CPT | Performed by: OBSTETRICS & GYNECOLOGY

## 2024-09-26 PROCEDURE — 84156 ASSAY OF PROTEIN URINE: CPT | Performed by: OBSTETRICS & GYNECOLOGY

## 2024-09-26 PROCEDURE — 36415 COLL VENOUS BLD VENIPUNCTURE: CPT | Performed by: OBSTETRICS & GYNECOLOGY

## 2024-09-26 RX ORDER — CETIRIZINE HYDROCHLORIDE 10 MG/1
10 TABLET ORAL DAILY
COMMUNITY

## 2024-09-26 RX ORDER — VITAMIN A, VITAMIN C, VITAMIN D-3, VITAMIN E, VITAMIN B-1, VITAMIN B-2, NIACIN, VITAMIN B-6, CALCIUM, IRON, ZINC, COPPER 4000; 120; 400; 22; 1.84; 3; 20; 10; 1; 12; 200; 27; 25; 2 [IU]/1; MG/1; [IU]/1; MG/1; MG/1; MG/1; MG/1; MG/1; MG/1; UG/1; MG/1; MG/1; MG/1; MG/1
TABLET ORAL DAILY
COMMUNITY

## 2024-09-26 RX ORDER — ASPIRIN 81 MG/1
81 TABLET, CHEWABLE ORAL DAILY
COMMUNITY

## 2024-09-26 RX ORDER — CEFUROXIME AXETIL 500 MG/1
500 TABLET ORAL 2 TIMES DAILY
COMMUNITY

## 2024-09-26 ASSESSMENT — ACTIVITIES OF DAILY LIVING (ADL)
ADLS_ACUITY_SCORE: 35
ADLS_ACUITY_SCORE: 35

## 2024-09-26 NOTE — PROGRESS NOTES
Data: [facility].   Reason for maternal/fetal assessment per patient is decreased fetal movement and preeclampsia evaluation. Patient is a . Prenatal record reviewed.   Gestational Age 33+3. VSS. Cervix: not examined.  Fetal movement present. Patient denies backache, vaginal discharge, pelvic pressure, UTI symptoms, GI problems, bloody show, vaginal bleeding, headache, epigastric or URQ pain, abdominal pain, rupture of membranes. Support persons family present.  Pt is reporting increased swelling, visual spotting, nausea/vomiting past 3 nights.    Action: Verbal consent for EFM. Triage assessment completed. EFM applied for fetal well being. Uterine assessment no uterine activity. Fetal assessment: Presumed adequate fetal oxygenation documented (see flow record).  Serial BPs began, urine sent and labs collected and sent.    Patient education forms given on triage discharge instructions Patient instructed to report change in fetal movement, vaginal leaking of fluid or bleeding, abdominal pain, or any concerns related to the pregnancy to her nurse/physician.     Dr. Pelayo informed of pts arrival, fetal and uterine tracing, lab results and serial BPs.  Plan per provider is discharge to home and follow up next as scheduled in clinic. Patient verbalized understanding of education and verbalized agreement with plan. Discharged ambulatory at 1735.

## 2024-09-27 ENCOUNTER — TRANSFERRED RECORDS (OUTPATIENT)
Dept: HEALTH INFORMATION MANAGEMENT | Facility: CLINIC | Age: 29
End: 2024-09-27
Payer: COMMERCIAL

## 2024-10-22 ENCOUNTER — HOME INFUSION (OUTPATIENT)
Dept: HOME HEALTH SERVICES | Facility: HOME HEALTH | Age: 29
End: 2024-10-22
Payer: COMMERCIAL

## 2024-10-22 VITALS — HEIGHT: 60 IN | BODY MASS INDEX: 30.95 KG/M2

## 2024-12-10 ENCOUNTER — DOCUMENTATION ONLY (OUTPATIENT)
Dept: NEUROLOGY | Facility: CLINIC | Age: 29
End: 2024-12-10

## 2024-12-10 NOTE — PROGRESS NOTES
MRI orders for brain & cervical have been faxed over to [facility] at [phone].  Subhash Perry EMT December 10, 2024      Hypothyroidism

## 2024-12-17 ENCOUNTER — TELEPHONE (OUTPATIENT)
Dept: PHARMACY | Facility: CLINIC | Age: 29
End: 2024-12-17
Payer: COMMERCIAL

## 2024-12-17 ENCOUNTER — VIRTUAL VISIT (OUTPATIENT)
Dept: PHARMACY | Facility: CLINIC | Age: 29
End: 2024-12-17
Attending: PSYCHIATRY & NEUROLOGY
Payer: COMMERCIAL

## 2024-12-17 DIAGNOSIS — G35 MS (MULTIPLE SCLEROSIS) (H): Primary | ICD-10-CM

## 2024-12-17 NOTE — PROGRESS NOTES
Medication Therapy Management (MTM) Encounter    ASSESSMENT:                            Medication Adherence/Access: No issues identified.    MS:  Reminded patient of Dr. Kwok's recommendation to get her next rituximab infusion 8 weeks post-surgery. No changes recommended at this time.     PLAN:                            Medication list updated and reviewed   As a reminder, Dr. Kwok recommended to get rituximab 8 weeks after your sinus surgery. Please contact [facility] ([phone]) to get your next infusion scheduled in February     Follow-up:   Appointments in Next Year      Dec 26, 2024 2:45 PM  (Arrive by 2:15 PM)  MR BRAIN CERVICAL SPINE WWO COMBO with EICMR1  [facility] ([facility]) [phone]     Jun 13, 2025 10:00 AM  (Arrive by 9:45 AM)  Return MS with Ana Kwok MD  [facility] [place] ([facility] ) [phone]     Jul 17, 2025 9:00 AM  Pharmacist Visit with Daja Story [facility] Neurology [facility] ([facility] [place] ) [phone]            SUBJECTIVE/OBJECTIVE:                          Robbi Don is a 29 year old female seen for a follow-up visit.       Reason for visit: MTM Follow Up.    Allergies/ADRs: Reviewed in chart  Past Medical History: Reviewed in chart  Tobacco: She reports that she has never smoked. She has never used smokeless tobacco.  Alcohol: minimal; currently breastfeeding   Specialty Providers: Neurologist: Dr. Kwok    Medication Adherence/Access: no issues reported.    MS:   - Rituximab 500mg every 6 months. Her last infusion was 1/29/24 with [facility].   - Vitamin D3 69345 units weekly     Delay in her infusions as she was pregnant. She is now breastfeeding. Had sinus surgery yesterday and was informed to get her next infusion 8 weeks after this procedure. She has no questions or concerns at  this time.     Medication History:   Rituximab 1000mg 10/25/  Rituximab 500 mg 12/29/21 - present, LD 1/29/2024    Today's Vitals: There were no vitals taken for this visit.  ----------------    I spent 5 minutes with this patient today. All changes were made via collaborative practice agreement with Ana Kwok. A copy of the visit note was provided to the patient's provider(s).    A summary of these recommendations was sent via [email or web address].    Daja Story, Pharm.D., MPH  Medication Therapy Management Pharmacist   [facility] Neurology Clinic    Telemedicine Visit Details  The patient's medications can be safely assessed via a telemedicine encounter.  Type of service:  Telephone visit  Originating Location (pt. Location): Home    Distant Location (provider location):  Off-site  Start Time:  10:30 AM  End Time: 10:35 AM     Medication Therapy Recommendations  No medication therapy recommendations to display

## 2024-12-17 NOTE — PATIENT INSTRUCTIONS
"Recommendations from today's MTM visit:                                                      Medication list updated and reviewed   As a reminder, Dr. Kwok recommended to get rituximab 8 weeks after your sinus surgery. Please contact [facility] ([phone]) to get your next infusion scheduled in February     Follow-up:   Appointments in Next Year      Dec 26, 2024 2:45 PM  (Arrive by 2:15 PM)  MR BRAIN CERVICAL SPINE WWO COMBO with EICMR1  [facility] [place] ([facility]) [phone]     Jun 13, 2025 10:00 AM  (Arrive by 9:45 AM)  Return MS with Ana Kwok MD  [facility] [place] ([facility] [place] ) [phone]     Jul 17, 2025 9:00 AM  Pharmacist Visit with Daja Story [facility] Neurology [facility] ([facility] ) [phone]            It was great speaking with you today.  I value your experience and would be very thankful for your time in providing feedback in our clinic survey. In the next few days, you may receive an email or text message from [employer] with a link to a survey related to your  clinical pharmacist.\"     To schedule another MTM appointment, please call the clinic directly or you may call the MTM scheduling line at [phone].    My Clinical Pharmacist's contact information:                                                      Please feel free to contact me with any questions or concerns you have.      Daja Story, Pharm.D., MPH  Medication Therapy Management Pharmacist   [facility]   "

## 2024-12-17 NOTE — CONFIDENTIAL NOTE
PharmD Outbound Call:     Reason for call:  missed MTM appointment    Call attempt: no answer, left VM for patient to call and reschedule. Provided MTM scheduling line [phone].    Daja Story, Pharm.D., MPH  Medication Therapy Management Pharmacist   [facility] Neurology [facility]

## 2025-01-16 ENCOUNTER — TELEPHONE (OUTPATIENT)
Dept: HOME HEALTH SERVICES | Facility: HOME HEALTH | Age: 30
End: 2025-01-16
Payer: COMMERCIAL

## 2025-01-16 NOTE — TELEPHONE ENCOUNTER
Writer received a call from patient who would like to switch her infusion from the [facility] to ER IS due to billing purposes.  Her next rituximab infusion is due in February 2025.  Pt states her last infusion was a year ago due to a pregnancy.     Informed patient once we have orders and auth in place someone will give her call.     Emailed [facility] pharm team, intake PA and billing.

## 2025-01-27 ENCOUNTER — HOME INFUSION (OUTPATIENT)
Dept: HOME HEALTH SERVICES | Facility: HOME HEALTH | Age: 30
End: 2025-01-27
Payer: COMMERCIAL

## 2025-02-06 ENCOUNTER — DOCUMENTATION ONLY (OUTPATIENT)
Dept: NEUROLOGY | Facility: CLINIC | Age: 30
End: 2025-02-06
Payer: COMMERCIAL

## 2025-02-06 NOTE — PROGRESS NOTES
Authorization has been received from [facility], approval valid from 01/01/2025 through 01/01/2026.  Subhash Perry EMT February 6, 2025

## 2025-02-24 ENCOUNTER — HOME CARE VISIT (OUTPATIENT)
Dept: HOME HEALTH SERVICES | Facility: HOME HEALTH | Age: 30
End: 2025-02-24
Payer: COMMERCIAL

## 2025-02-24 ENCOUNTER — HOME INFUSION BILLING (OUTPATIENT)
Dept: HOME HEALTH SERVICES | Facility: HOME HEALTH | Age: 30
End: 2025-02-24
Payer: COMMERCIAL

## 2025-02-24 ENCOUNTER — LAB REQUISITION (OUTPATIENT)
Dept: LAB | Facility: CLINIC | Age: 30
End: 2025-02-24
Payer: COMMERCIAL

## 2025-02-24 VITALS
TEMPERATURE: 96.9 F | DIASTOLIC BLOOD PRESSURE: 76 MMHG | BODY MASS INDEX: 37.58 KG/M2 | HEART RATE: 86 BPM | WEIGHT: 198 LBS | OXYGEN SATURATION: 97 % | SYSTOLIC BLOOD PRESSURE: 129 MMHG | RESPIRATION RATE: 16 BRPM

## 2025-02-24 DIAGNOSIS — G35 MULTIPLE SCLEROSIS (H): ICD-10-CM

## 2025-02-24 LAB
BASOPHILS # BLD AUTO: 0 10E3/UL (ref 0–0.2)
BASOPHILS NFR BLD AUTO: 1 %
CD19 B CELL COMMENT: NORMAL
CD19 CELLS # BLD: 167 CELLS/UL (ref 107–698)
CD19 CELLS NFR BLD: 8 % (ref 6–27)
EOSINOPHIL # BLD AUTO: 0.1 10E3/UL (ref 0–0.7)
EOSINOPHIL NFR BLD AUTO: 1 %
ERYTHROCYTE [DISTWIDTH] IN BLOOD BY AUTOMATED COUNT: 13.7 % (ref 10–15)
HCT VFR BLD AUTO: 43.1 % (ref 35–47)
HGB BLD-MCNC: 14.6 G/DL (ref 11.7–15.7)
HOLD SPECIMEN: NORMAL
IGG SERPL-MCNC: 489 MG/DL (ref 610–1616)
IMM GRANULOCYTES # BLD: 0 10E3/UL
IMM GRANULOCYTES NFR BLD: 0 %
LYMPHOCYTES # BLD AUTO: 1.9 10E3/UL (ref 0.8–5.3)
LYMPHOCYTES NFR BLD AUTO: 37 %
MCH RBC QN AUTO: 26.3 PG (ref 26.5–33)
MCHC RBC AUTO-ENTMCNC: 33.9 G/DL (ref 31.5–36.5)
MCV RBC AUTO: 78 FL (ref 78–100)
MONOCYTES # BLD AUTO: 0.4 10E3/UL (ref 0–1.3)
MONOCYTES NFR BLD AUTO: 8 %
NEUTROPHILS # BLD AUTO: 2.6 10E3/UL (ref 1.6–8.3)
NEUTROPHILS NFR BLD AUTO: 52 %
NRBC # BLD AUTO: 0 10E3/UL
NRBC BLD AUTO-RTO: 0 /100
PLATELET # BLD AUTO: 325 10E3/UL (ref 150–450)
RBC # BLD AUTO: 5.56 10E6/UL (ref 3.8–5.2)
WBC # BLD AUTO: 5 10E3/UL (ref 4–11)

## 2025-02-24 PROCEDURE — 82784 ASSAY IGA/IGD/IGG/IGM EACH: CPT | Performed by: PSYCHIATRY & NEUROLOGY

## 2025-02-24 PROCEDURE — S1015 IV TUBING EXTENSION SET: HCPCS

## 2025-02-24 PROCEDURE — 85004 AUTOMATED DIFF WBC COUNT: CPT | Performed by: PSYCHIATRY & NEUROLOGY

## 2025-02-24 PROCEDURE — E0781 EXTERNAL AMBULATORY INFUS PU: HCPCS | Mod: RR

## 2025-02-24 PROCEDURE — A4215 STERILE NEEDLE: HCPCS

## 2025-02-24 PROCEDURE — 85048 AUTOMATED LEUKOCYTE COUNT: CPT | Performed by: PSYCHIATRY & NEUROLOGY

## 2025-02-24 PROCEDURE — 85014 HEMATOCRIT: CPT | Performed by: PSYCHIATRY & NEUROLOGY

## 2025-02-24 PROCEDURE — 86355 B CELLS TOTAL COUNT: CPT | Performed by: PSYCHIATRY & NEUROLOGY

## 2025-02-24 PROCEDURE — S9331 HIT INTERMIT CHEMO DIEM: HCPCS

## 2025-02-24 NOTE — PROGRESS NOTES
Nursing Visit Note:  Nurse visit today for Truxima  for Robbi Don.     present during visit today: Not Applicable.    Intravenous Access:  Lab draw site left AC, Attempts 2, Labs drawn without difficulty., and Peripheral IV placed.    Infusion Nursing Note:    Pre-infusion Checklist:   Have you had any delayed reaction since last infusion?   No     Have you recently had an elevated temperature, fever, chills productive cough, coughing for 3 weeks or longer or hemoptysis, abnormal vital signs, night sweats, chest pain, or have you noticed a decrease in your appetite, or noted unexplained weight loss or fatigue?   No     Do you have any open wounds or new incisions?  No     Do you have any recent or upcoming hospitalizations, surgeries, or dental procedures? Does not include esophagogastroduodenoscopy, colonoscopy, endoscopic retrograde cholangiopancreatography (ERCP), endoscopic ultrasound (EUS), dental procedures or joint aspiration/steroid injections.   No     Do you currently have or recently have had any signs of illness or infection or are you on any antibiotics?   No     Have you had any new, sudden, or worsening abdominal pain?   No     Have you or anyone in your household received a live vaccination in the past 4 weeks?   No     Have you recently been diagnosed with any new nervous system diseases or cancer diagnosis? (i.e., Multiple Sclerosis, Guillain [place], seizures, neurological changes) Are you receiving any form of radiation or chemotherapy?   No     Are you pregnant or breastfeeding, or do you have plans of pregnancy in the future?   No     Have you been having any signs of worsening depression or suicidal ideation?  No     Have you had any other new onset medical symptoms?  No    Entyvio/Ocrevus/Tyasabri only: Have you been having any new or worsening medical problems such as issues with thinking, eyesight, balance or strength that have persisted over several days?  "  N/A    Benlysta only: Have you been having any signs of worsening depression or suicidal ideations?    N/A    IVIG only: Have you had any new blood clots?  N/A    Did the patient answer \"YES\" to any of the questions above?  No     Will the patient receive a medication that has an order for infusion reaction management?  Yes, and all drugs and supplies are available and none have .     If ordered, has the patient taken pre-medications?  Yes    Plan:   Therapy is appropriate, will proceed with treatment.     Post Infusion Assessment:  Patient tolerated infusion without incident.  Blood return noted pre and post infusion.  Access discontinued per protocol.      and Lab-Only Nursing Note    Labs obtained via VPT    Time Specimen drawn: 930    Last dose (if applicable): No    Facility sent to: [facility] Tracking number: [id number]    Note: patient says since starting Truxima she hasn t noticed any MS symptoms.     PIV was placed. Labs drawn. 10 mL normal saline used.     Pre-medication given:    650 mg acetaminophen by mouth at 0932  25 mg Benadryl by mouth at 0932  125 mg iv push methylprednisolone at [id number]    10 mL normal saline used after methylprednisolone given.     20 mL normal saline added to end of infusion.     Saline administered: 20 (ml)    Supply Check:   Does the patient have all the supplies they need for the next visit?  Yes    Next visit plan:  at 9:30am    Luciana Barton RN 2025  "

## 2025-03-24 PROCEDURE — E0781 EXTERNAL AMBULATORY INFUS PU: HCPCS | Mod: RR

## 2025-04-24 PROCEDURE — E0781 EXTERNAL AMBULATORY INFUS PU: HCPCS | Mod: RR

## 2025-05-24 PROCEDURE — E0781 EXTERNAL AMBULATORY INFUS PU: HCPCS | Mod: RR

## 2025-06-13 ENCOUNTER — OFFICE VISIT (OUTPATIENT)
Dept: NEUROLOGY | Facility: CLINIC | Age: 30
End: 2025-06-13
Attending: PSYCHIATRY & NEUROLOGY
Payer: COMMERCIAL

## 2025-06-13 VITALS
DIASTOLIC BLOOD PRESSURE: 79 MMHG | HEART RATE: 80 BPM | OXYGEN SATURATION: 97 % | RESPIRATION RATE: 16 BRPM | SYSTOLIC BLOOD PRESSURE: 110 MMHG

## 2025-06-13 DIAGNOSIS — G35 MS (MULTIPLE SCLEROSIS) (H): Primary | ICD-10-CM

## 2025-06-13 PROCEDURE — 82784 ASSAY IGA/IGD/IGG/IGM EACH: CPT | Performed by: PSYCHIATRY & NEUROLOGY

## 2025-06-13 PROCEDURE — 99000 SPECIMEN HANDLING OFFICE-LAB: CPT | Performed by: PATHOLOGY

## 2025-06-13 PROCEDURE — 86355 B CELLS TOTAL COUNT: CPT | Performed by: PSYCHIATRY & NEUROLOGY

## 2025-06-13 PROCEDURE — 99213 OFFICE O/P EST LOW 20 MIN: CPT | Performed by: PSYCHIATRY & NEUROLOGY

## 2025-06-13 PROCEDURE — 99214 OFFICE O/P EST MOD 30 MIN: CPT | Mod: GC | Performed by: PSYCHIATRY & NEUROLOGY

## 2025-06-13 NOTE — LETTER
6/13/2025       RE: Robbi Don  [id number] [place] [address]     Dear Colleague,    Thank you for referring your patient, Robbi Don, to the [facility] [place] at [facility]. Please see a copy of my visit note below.    Date of Service: 6/13/2025    [facility] Neurology   MS Clinic Follow-up     Subjective: 30-year-old right-handed woman with a history of PCOS who presents in follow-up for multiple sclerosis.    No new symptoms. Vision is stable. Had numbness of bilateral hands during pregnancy which has resolved postpartum. No sensory or motor symptoms of arms or legs. Denies bowel or bladder issues. Does not feel that MS is limiting her activities.    Baby boy is now 7 months old. Recall that she had chronic cough throughout pregnancy. Post-partum she underwent sinus surgery and another round of antibiotics. Her cough cleared and has not returned over the past few months. No other concerns for infection.    Is interested in another pregnancy. No longer breastfeeding. Has returned to work as . Son not yet in  - her mom stays with him.    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    DMD's:   Rituximab 10/25/18-present, last dose 6/8/2021 of 1000 mg q6mo   rtx 500 mg 12/29/21 - present, LD 1/29/2024 -> pregnancy -> 2/24/2025    Vitamin D: 50,000 international units every week      Allergies   Allergen Reactions     Sulfamethoxazole-Trimethoprim Rash       Current Outpatient Medications   Medication Sig Dispense Refill     acetaminophen (TYLENOL) 325 MG tablet Take 2 tablets (650 mg) by mouth every 6 months. Administer 30 minutes prior to infusion 4 tablet 0     cefuroxime (CEFTIN) 500 MG tablet Take 500 mg by mouth 2 times daily.       diphenhydrAMINE (BENADRYL) 25 MG capsule Take 1 capsule (25 mg) by mouth every 6 months. Administer 30  "minutes prior to infusion 2 capsule 0     Emergency Supply Kit, PIV, Patient use for emergency only. Contents: 3 sodium chloride 0.9% flushes, 1 IV start kit, 1 microclave ext set 14\", 1 each IV Cath 22 G/1\" and 24G/3/4\", 6 alcohol prep pads, 4 nitrile gloves (med). Call [phone] to reorder. [id number] kit 0     methylPREDNISolone Na Suc (solu-MEDROL) 125 mg in sodium chloride 0.9 % 12.5 mL injection Inject 125 mg over 5-10 minutes into the vein via push every 6 months. Administer 30 minutes prior to infusion 42965 mL 0     Prenatal Vit-Fe Fumarate-FA (PRENATAL MULTIVITAMIN  PLUS IRON) 27-1 MG TABS Take by mouth daily.       riTUXimab-abbs (TRUXIMA) 500 mg in sodium chloride 0.9 % 250 mL via CADD pump Infuse 500 mg into the vein every 6 months. Do not shake. Continuous rate: 50 mL/hr x30 min, 100 mL/hr x30 min, 150 mL/hr x30 min, 200 mL/hr until infusion complete. Flush bag with 20 mL NS to flush tubing. [id number] mL 0     riTUXimab-abbs 500 mg Inject 500 mLs (500 mg) into the vein every 6 months       sodium chloride, PF, 0.9% PF flush Inject 10 mLs into the vein as needed for line flush. Flush IV before and after medication administration as directed and/or at least every 12 hours. 425030 mL 0     No current facility-administered medications for this visit.        Past medical, surgical, social and family history was personally reviewed. Pertinent details noted above.     Physical Examination:   /79 (BP Location: Right arm, Patient Position: Sitting, Cuff Size: Adult Large)   Pulse 80   Resp 16   SpO2 97%     General: no acute distress  Cranial nerves:   VFFC  PERRL w/no RAPD  EOM full w/no FLOR   Face symmetric  Hearing intact  No dysarthria   Motor:   Tone is normal   Bulk is normal     R L  Deltoid  5 5  Biceps  5 5  Triceps  5 5  Wrist ext 5 5  Finger ext 5 5  Finger abd 5 5    Hip flexion 5 5  Knee flexion 5 5  Knee ext 5 5  Ankle d/f 5 5    Reflexes: 2+ and symmetric throughout, babinski absent " bilaterally  Sensory: vibration is normal in the toes, JPS normal in the toes   Romberg is absent  Coordination: no ataxia or dysmetria  Gait: normal base and stride, tandem gait is intact, able to balance on one foot and hop x5 with either leg; able to get up from the chair with either leg    Tests/Imaging:   JCV 3.2  D 55 1/2019   30 10/2020  40 7/2024    Alc 1000 -> 1900  CD19 167 (02/2025, 13 months after previous rituximab)  Igg 619-> 400s    MRI brain   9/14/18 - there are a number of periventricular demyelinating lesions noted predominantly off the atria and occipital poles bilaterally but more prominent on the right, all of the lesions enhance, there is typical ring-enhancement noted that is more apparent on the coronal views whereas enhancement pattern on axial views appears more heterogeneous  9/30/18 - personally compared to the initial study with pt, slight growth in lesions on the right side  12/21/18 - reduction in size of lesions, resolution of gadolinium enhancement, one new lesion off right atrium punctate in size but gd-  11/2019 - no new lesions, gd-  8/2020 - no new lesions, gd-  8/2021 - no new lesions, gd-   8/2022 - no new lesions, gd-   10/2023 - no new lesions, gd-  12/2024 - no new lesions, gd-    MRI cervical spine  10/2018 - personally reviewed, no definite lesions, gd-, poor quality  8/2020 - no new lesions, gd-  8/2022 - no new lesions, deg changes noted  10/2023 - no new lesions, gd-   12/2024 - no new lesions, gd-    MRI thoracic spine  10/2018 - personally reviewed, no definite lesions though possible small eccentric lesions present, gd-, small syrinx  8/2020 - no new lesions, gd-, stable syrinx   10/2023 - no lesions    Assessment: 30-year-old right-handed woman with relapsing remitting multiple sclerosis who has been clinically and radiologically stable on rituximab.    No new symptoms suggestive of relapse. She had a persistent cough throughout pregnancy; underwent sinus surgery  post-partum and another round of antibiotics. Cough resolved, and no other infectious concerns over the past three months.    Exam today is intact.    Discussed pregnancy considerations. Recommended that she undergo next rituximab infusion (will be 2nd infusion since last pregnancy) and then wait 8 weeks until trying to conceive.    She does ask about long-term when she might be able to discontinue disease modifying therapy - discussed considerations involved in this decision.    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 Will recheck today, as well as B cells to evaluate suppression after resumption of rituximab.    Plan:   - Proceed with rituximab in August 2025  - Blood work today (CD19, IgG) and again at infusion in August  - Follow-up in 6 months (virtual visit okay)    Patient was seen and discussed with staff neurologist Dr. Kwok.    Logan Mckenzie MD  Neurology Resident    Attestation signed by Ana Kwok MD at 6/17/2025  7:33 AM:  I personally saw and evaluated Mrs. Don with Dr. Mckenzie on the date of service.  I have reviewed the above documentation and agree with the findings and recommendations.     A total of 30 minutes were personally spent in the care of this patient on the date of service.     Ana Kwok MD on 6/17/2025 at 7:33 AM      Again, thank you for allowing me to participate in the care of your patient.      Sincerely,    Ana Kwok MD

## 2025-06-13 NOTE — PROGRESS NOTES
"Date of Service: 6/13/2025    [facility] Neurology   MS Clinic Follow-up     Subjective: 30-year-old right-handed woman with a history of PCOS who presents in follow-up for multiple sclerosis.    No new symptoms. Vision is stable. Had numbness of bilateral hands during pregnancy which has resolved postpartum. No sensory or motor symptoms of arms or legs. Denies bowel or bladder issues. Does not feel that MS is limiting her activities.    Baby boy is now 7 months old. Recall that she had chronic cough throughout pregnancy. Post-partum she underwent sinus surgery and another round of antibiotics. Her cough cleared and has not returned over the past few months. No other concerns for infection.    Is interested in another pregnancy. No longer breastfeeding. Has returned to work as . Son not yet in  - her mom stays with him.    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    DMD's:   Rituximab 10/25/18-present, last dose 6/8/2021 of 1000 mg q6mo   rtx 500 mg 12/29/21 - present, LD 1/29/2024 -> pregnancy -> 2/24/2025    Vitamin D: 50,000 international units every week      Allergies   Allergen Reactions    Sulfamethoxazole-Trimethoprim Rash       Current Outpatient Medications   Medication Sig Dispense Refill    acetaminophen (TYLENOL) 325 MG tablet Take 2 tablets (650 mg) by mouth every 6 months. Administer 30 minutes prior to infusion 4 tablet 0    cefuroxime (CEFTIN) 500 MG tablet Take 500 mg by mouth 2 times daily.      diphenhydrAMINE (BENADRYL) 25 MG capsule Take 1 capsule (25 mg) by mouth every 6 months. Administer 30 minutes prior to infusion 2 capsule 0    Emergency Supply Kit, PIV, Patient use for emergency only. Contents: 3 sodium chloride 0.9% flushes, 1 IV start kit, 1 microclave ext set 14\", 1 each IV Cath 22 G/1\" and 24G/3/4\", 6 alcohol prep pads, 4 nitrile gloves (med). Call [phone] to reorder. [id number] kit 0    methylPREDNISolone Na Suc " (solu-MEDROL) 125 mg in sodium chloride 0.9 % 12.5 mL injection Inject 125 mg over 5-10 minutes into the vein via push every 6 months. Administer 30 minutes prior to infusion 30540 mL 0    Prenatal Vit-Fe Fumarate-FA (PRENATAL MULTIVITAMIN  PLUS IRON) 27-1 MG TABS Take by mouth daily.      riTUXimab-abbs (TRUXIMA) 500 mg in sodium chloride 0.9 % 250 mL via CADD pump Infuse 500 mg into the vein every 6 months. Do not shake. Continuous rate: 50 mL/hr x30 min, 100 mL/hr x30 min, 150 mL/hr x30 min, 200 mL/hr until infusion complete. Flush bag with 20 mL NS to flush tubing. 555518 mL 0    riTUXimab-abbs 500 mg Inject 500 mLs (500 mg) into the vein every 6 months      sodium chloride, PF, 0.9% PF flush Inject 10 mLs into the vein as needed for line flush. Flush IV before and after medication administration as directed and/or at least every 12 hours. 371189 mL 0     No current facility-administered medications for this visit.        Past medical, surgical, social and family history was personally reviewed. Pertinent details noted above.     Physical Examination:   /79 (BP Location: Right arm, Patient Position: Sitting, Cuff Size: Adult Large)   Pulse 80   Resp 16   SpO2 97%     General: no acute distress  Cranial nerves:   VFFC  PERRL w/no RAPD  EOM full w/no FLOR   Face symmetric  Hearing intact  No dysarthria   Motor:   Tone is normal   Bulk is normal     R L  Deltoid  5 5  Biceps  5 5  Triceps  5 5  Wrist ext 5 5  Finger ext 5 5  Finger abd 5 5    Hip flexion 5 5  Knee flexion 5 5  Knee ext 5 5  Ankle d/f 5 5    Reflexes: 2+ and symmetric throughout, babinski absent bilaterally  Sensory: vibration is normal in the toes, JPS normal in the toes   Romberg is absent  Coordination: no ataxia or dysmetria  Gait: normal base and stride, tandem gait is intact, able to balance on one foot and hop x5 with either leg; able to get up from the chair with either leg    Tests/Imaging:   JCV 3.2  D 55 1/2019   30 10/2020  40  7/2024    Alc 1000 -> 1900  CD19 167 (02/2025, 13 months after previous rituximab)  Igg 619-> 400s    MRI brain   9/14/18 - there are a number of periventricular demyelinating lesions noted predominantly off the atria and occipital poles bilaterally but more prominent on the right, all of the lesions enhance, there is typical ring-enhancement noted that is more apparent on the coronal views whereas enhancement pattern on axial views appears more heterogeneous  9/30/18 - personally compared to the initial study with pt, slight growth in lesions on the right side  12/21/18 - reduction in size of lesions, resolution of gadolinium enhancement, one new lesion off right atrium punctate in size but gd-  11/2019 - no new lesions, gd-  8/2020 - no new lesions, gd-  8/2021 - no new lesions, gd-   8/2022 - no new lesions, gd-   10/2023 - no new lesions, gd-  12/2024 - no new lesions, gd-    MRI cervical spine  10/2018 - personally reviewed, no definite lesions, gd-, poor quality  8/2020 - no new lesions, gd-  8/2022 - no new lesions, deg changes noted  10/2023 - no new lesions, gd-   12/2024 - no new lesions, gd-    MRI thoracic spine  10/2018 - personally reviewed, no definite lesions though possible small eccentric lesions present, gd-, small syrinx  8/2020 - no new lesions, gd-, stable syrinx   10/2023 - no lesions    Assessment: 30-year-old right-handed woman with relapsing remitting multiple sclerosis who has been clinically and radiologically stable on rituximab.    No new symptoms suggestive of relapse. She had a persistent cough throughout pregnancy; underwent sinus surgery post-partum and another round of antibiotics. Cough resolved, and no other infectious concerns over the past three months.    Exam today is intact.    Discussed pregnancy considerations. Recommended that she undergo next rituximab infusion (will be 2nd infusion since last pregnancy) and then wait 8 weeks until trying to conceive.    She does ask  about long-term when she might be able to discontinue disease modifying therapy - discussed considerations involved in this decision.    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 Will recheck today, as well as B cells to evaluate suppression after resumption of rituximab.    Plan:   - Proceed with rituximab in August 2025  - Blood work today (CD19, IgG) and again at infusion in August  - Follow-up in 6 months (virtual visit okay)    Patient was seen and discussed with staff neurologist Dr. Kwok.    Logan Mckenzie MD  Neurology Resident

## 2025-06-13 NOTE — NURSING NOTE
Chief Complaint   Patient presents with    RECHECK     Here for injections, confirmed with patient     Natasha Hastings

## 2025-06-13 NOTE — PATIENT INSTRUCTIONS
Ok to delay rituximab until September    Blood work today   Again on the day of infusion     Follow up in 6 months (ok if virtual)

## 2025-06-17 ENCOUNTER — RESULTS FOLLOW-UP (OUTPATIENT)
Dept: NEUROLOGY | Facility: CLINIC | Age: 30
End: 2025-06-17

## 2025-06-24 PROCEDURE — E0781 EXTERNAL AMBULATORY INFUS PU: HCPCS | Mod: RR

## 2025-07-17 ENCOUNTER — TELEPHONE (OUTPATIENT)
Dept: PHARMACY | Facility: CLINIC | Age: 30
End: 2025-07-17
Payer: COMMERCIAL

## 2025-07-17 NOTE — CONFIDENTIAL NOTE
PharmD Outbound Call:     Reason for call: missed MTM appointment    Call attempt: no answer, left VM for patient to call and/or reschedule. Provided MTM scheduling line [phone].    Daja Story, Pharm.D., MPH  Medication Therapy Management Pharmacist   [facility] Neurology [facility]  
Home

## 2025-07-24 PROCEDURE — E0781 EXTERNAL AMBULATORY INFUS PU: HCPCS | Mod: RR
